# Patient Record
Sex: FEMALE | Race: OTHER | NOT HISPANIC OR LATINO | URBAN - METROPOLITAN AREA
[De-identification: names, ages, dates, MRNs, and addresses within clinical notes are randomized per-mention and may not be internally consistent; named-entity substitution may affect disease eponyms.]

---

## 2019-12-26 ENCOUNTER — EMERGENCY (EMERGENCY)
Facility: HOSPITAL | Age: 64
LOS: 1 days | Discharge: HOME | End: 2019-12-26
Attending: EMERGENCY MEDICINE
Payer: MEDICAID

## 2019-12-26 VITALS
TEMPERATURE: 98 F | OXYGEN SATURATION: 97 % | DIASTOLIC BLOOD PRESSURE: 68 MMHG | RESPIRATION RATE: 17 BRPM | HEART RATE: 96 BPM | WEIGHT: 149.91 LBS | SYSTOLIC BLOOD PRESSURE: 117 MMHG | HEIGHT: 64 IN

## 2019-12-26 LAB
ALBUMIN SERPL ELPH-MCNC: 4.5 G/DL — SIGNIFICANT CHANGE UP (ref 3.5–5)
ALP SERPL-CCNC: 93 U/L — SIGNIFICANT CHANGE UP (ref 40–120)
ALT FLD-CCNC: 37 U/L DA — SIGNIFICANT CHANGE UP (ref 10–60)
ANION GAP SERPL CALC-SCNC: 6 MMOL/L — SIGNIFICANT CHANGE UP (ref 5–17)
APPEARANCE UR: CLEAR — SIGNIFICANT CHANGE UP
AST SERPL-CCNC: 25 U/L — SIGNIFICANT CHANGE UP (ref 10–40)
BACTERIA # UR AUTO: ABNORMAL /HPF
BASOPHILS # BLD AUTO: 0.03 K/UL — SIGNIFICANT CHANGE UP (ref 0–0.2)
BASOPHILS NFR BLD AUTO: 0.3 % — SIGNIFICANT CHANGE UP (ref 0–2)
BILIRUB SERPL-MCNC: 0.3 MG/DL — SIGNIFICANT CHANGE UP (ref 0.2–1.2)
BILIRUB UR-MCNC: NEGATIVE — SIGNIFICANT CHANGE UP
BUN SERPL-MCNC: 25 MG/DL — HIGH (ref 7–18)
CALCIUM SERPL-MCNC: 9.5 MG/DL — SIGNIFICANT CHANGE UP (ref 8.4–10.5)
CHLORIDE SERPL-SCNC: 107 MMOL/L — SIGNIFICANT CHANGE UP (ref 96–108)
CO2 SERPL-SCNC: 23 MMOL/L — SIGNIFICANT CHANGE UP (ref 22–31)
COLOR SPEC: YELLOW — SIGNIFICANT CHANGE UP
CREAT SERPL-MCNC: 1.19 MG/DL — SIGNIFICANT CHANGE UP (ref 0.5–1.3)
DIFF PNL FLD: NEGATIVE — SIGNIFICANT CHANGE UP
EOSINOPHIL # BLD AUTO: 0.19 K/UL — SIGNIFICANT CHANGE UP (ref 0–0.5)
EOSINOPHIL NFR BLD AUTO: 1.9 % — SIGNIFICANT CHANGE UP (ref 0–6)
EPI CELLS # UR: SIGNIFICANT CHANGE UP /HPF
GLUCOSE SERPL-MCNC: 243 MG/DL — HIGH (ref 70–99)
GLUCOSE UR QL: 1000 MG/DL
HCG UR QL: NEGATIVE — SIGNIFICANT CHANGE UP
HCT VFR BLD CALC: 38 % — SIGNIFICANT CHANGE UP (ref 34.5–45)
HGB BLD-MCNC: 12.1 G/DL — SIGNIFICANT CHANGE UP (ref 11.5–15.5)
IMM GRANULOCYTES NFR BLD AUTO: 0.4 % — SIGNIFICANT CHANGE UP (ref 0–1.5)
KETONES UR-MCNC: NEGATIVE — SIGNIFICANT CHANGE UP
LEUKOCYTE ESTERASE UR-ACNC: ABNORMAL
LYMPHOCYTES # BLD AUTO: 1.23 K/UL — SIGNIFICANT CHANGE UP (ref 1–3.3)
LYMPHOCYTES # BLD AUTO: 12.2 % — LOW (ref 13–44)
MCHC RBC-ENTMCNC: 30.6 PG — SIGNIFICANT CHANGE UP (ref 27–34)
MCHC RBC-ENTMCNC: 31.8 GM/DL — LOW (ref 32–36)
MCV RBC AUTO: 96.2 FL — SIGNIFICANT CHANGE UP (ref 80–100)
MONOCYTES # BLD AUTO: 0.48 K/UL — SIGNIFICANT CHANGE UP (ref 0–0.9)
MONOCYTES NFR BLD AUTO: 4.8 % — SIGNIFICANT CHANGE UP (ref 2–14)
NEUTROPHILS # BLD AUTO: 8.12 K/UL — HIGH (ref 1.8–7.4)
NEUTROPHILS NFR BLD AUTO: 80.4 % — HIGH (ref 43–77)
NITRITE UR-MCNC: NEGATIVE — SIGNIFICANT CHANGE UP
NRBC # BLD: 0 /100 WBCS — SIGNIFICANT CHANGE UP (ref 0–0)
PH UR: 5 — SIGNIFICANT CHANGE UP (ref 5–8)
PLATELET # BLD AUTO: 217 K/UL — SIGNIFICANT CHANGE UP (ref 150–400)
POTASSIUM SERPL-MCNC: 4.8 MMOL/L — SIGNIFICANT CHANGE UP (ref 3.5–5.3)
POTASSIUM SERPL-SCNC: 4.8 MMOL/L — SIGNIFICANT CHANGE UP (ref 3.5–5.3)
PROT SERPL-MCNC: 8.4 G/DL — HIGH (ref 6–8.3)
PROT UR-MCNC: 15
RBC # BLD: 3.95 M/UL — SIGNIFICANT CHANGE UP (ref 3.8–5.2)
RBC # FLD: 12.1 % — SIGNIFICANT CHANGE UP (ref 10.3–14.5)
RBC CASTS # UR COMP ASSIST: SIGNIFICANT CHANGE UP /HPF (ref 0–2)
SODIUM SERPL-SCNC: 136 MMOL/L — SIGNIFICANT CHANGE UP (ref 135–145)
SP GR SPEC: 1.01 — SIGNIFICANT CHANGE UP (ref 1.01–1.02)
UROBILINOGEN FLD QL: NEGATIVE — SIGNIFICANT CHANGE UP
WBC # BLD: 10.09 K/UL — SIGNIFICANT CHANGE UP (ref 3.8–10.5)
WBC # FLD AUTO: 10.09 K/UL — SIGNIFICANT CHANGE UP (ref 3.8–10.5)
WBC UR QL: SIGNIFICANT CHANGE UP /HPF (ref 0–5)

## 2019-12-26 PROCEDURE — 99284 EMERGENCY DEPT VISIT MOD MDM: CPT

## 2019-12-26 PROCEDURE — 80053 COMPREHEN METABOLIC PANEL: CPT

## 2019-12-26 PROCEDURE — 71046 X-RAY EXAM CHEST 2 VIEWS: CPT | Mod: 26

## 2019-12-26 PROCEDURE — 99284 EMERGENCY DEPT VISIT MOD MDM: CPT | Mod: 25

## 2019-12-26 PROCEDURE — 71046 X-RAY EXAM CHEST 2 VIEWS: CPT

## 2019-12-26 PROCEDURE — 87086 URINE CULTURE/COLONY COUNT: CPT

## 2019-12-26 PROCEDURE — 36415 COLL VENOUS BLD VENIPUNCTURE: CPT

## 2019-12-26 PROCEDURE — 85027 COMPLETE CBC AUTOMATED: CPT

## 2019-12-26 PROCEDURE — 81001 URINALYSIS AUTO W/SCOPE: CPT

## 2019-12-26 PROCEDURE — 81025 URINE PREGNANCY TEST: CPT

## 2019-12-26 PROCEDURE — 96374 THER/PROPH/DIAG INJ IV PUSH: CPT

## 2019-12-26 RX ORDER — SODIUM CHLORIDE 9 MG/ML
1000 INJECTION INTRAMUSCULAR; INTRAVENOUS; SUBCUTANEOUS ONCE
Refills: 0 | Status: COMPLETED | OUTPATIENT
Start: 2019-12-26 | End: 2019-12-26

## 2019-12-26 RX ORDER — ACETAMINOPHEN 500 MG
1000 TABLET ORAL ONCE
Refills: 0 | Status: COMPLETED | OUTPATIENT
Start: 2019-12-26 | End: 2019-12-26

## 2019-12-26 RX ADMIN — Medication 400 MILLIGRAM(S): at 14:04

## 2019-12-26 RX ADMIN — SODIUM CHLORIDE 1000 MILLILITER(S): 9 INJECTION INTRAMUSCULAR; INTRAVENOUS; SUBCUTANEOUS at 14:04

## 2019-12-26 NOTE — ED PROVIDER NOTE - PATIENT PORTAL LINK FT
You can access the FollowMyHealth Patient Portal offered by [facility] by registering at the following website: [email or web address]. By joining [employer]’s FollowMyHealth portal, you will also be able to view your health information using other applications (apps) compatible with our system.

## 2019-12-26 NOTE — ED PROVIDER NOTE - OBJECTIVE STATEMENT
64 year old female with a PMHx of HLD, HTN, and DM presenting with CC of diarrhea, cough and general malaise x3 days. Patient denies measured fever but has a sick contact at home. Patient states that when she cough, she has phlegm. Patient denies chest pain, dizziness, shortness of breath, or any other acute complaints.

## 2019-12-26 NOTE — ED PROVIDER NOTE - PROGRESS NOTE DETAILS
Patient expressing relief will Pt is well appearing walking with steady gait, stable for discharge and follow up without fail with medical doctor. I had a detailed discussion with the patient and/or guardian regarding the historical points, exam findings, and any diagnostic results supporting the discharge diagnosis. Pt educated on care and need for follow up. Strict return instructions and red flag signs and symptoms discussed with patient. Questions answered. Pt shows understanding of discharge information and agrees to follow.

## 2019-12-26 NOTE — ED PROVIDER NOTE - CLINICAL SUMMARY MEDICAL DECISION MAKING FREE TEXT BOX
Patient presenting with a few days of cough, diarrhea, and generalized malaise. Give fluids, order basic labs, order x-ray, and reassess.

## 2019-12-26 NOTE — ED ADULT NURSE NOTE - OBJECTIVE STATEMENT
pt is here for flu-like symptoms.  pt stated that flu like symptom,  and daughter with similar symptom, denied sob or fever, denied N/V/d, pt calm at this time.

## 2019-12-27 LAB
CULTURE RESULTS: SIGNIFICANT CHANGE UP
SPECIMEN SOURCE: SIGNIFICANT CHANGE UP

## 2020-09-21 NOTE — ED ADULT NURSE NOTE - ISOLATION TYPE:
[phone] to schedule ultrasound; if negative likely a muscle strain that will resolve on its own       None

## 2022-02-04 NOTE — ED ADULT TRIAGE NOTE - AS O2 DELIVERY
No indication for inhalers currently, does not have any improvement in symptoms and no obstruction on PFTs. Denies cough or wheezing.    room air

## 2022-08-30 NOTE — ED PROVIDER NOTE - PRINCIPAL DIAGNOSIS
Ro/ Dr Michelle Og 09/09/2022   Will call patient with new instructions once prescription sent
Viral illness

## 2024-11-17 ENCOUNTER — INPATIENT (INPATIENT)
Facility: HOSPITAL | Age: 69
LOS: 3 days | Discharge: HOME | DRG: 440 | End: 2024-11-21
Attending: HOSPITALIST | Admitting: HOSPITALIST
Payer: COMMERCIAL

## 2024-11-17 VITALS
SYSTOLIC BLOOD PRESSURE: 150 MMHG | DIASTOLIC BLOOD PRESSURE: 89 MMHG | HEART RATE: 70 BPM | RESPIRATION RATE: 16 BRPM | HEIGHT: 62 IN | OXYGEN SATURATION: 97 % | WEIGHT: 156.53 LBS

## 2024-11-17 DIAGNOSIS — K85.10 BILIARY ACUTE PANCREATITIS WITHOUT NECROSIS OR INFECTION: ICD-10-CM

## 2024-11-17 DIAGNOSIS — N18.30 CHRONIC KIDNEY DISEASE, STAGE 3 UNSPECIFIED: ICD-10-CM

## 2024-11-17 DIAGNOSIS — Z98.890 OTHER SPECIFIED POSTPROCEDURAL STATES: Chronic | ICD-10-CM

## 2024-11-17 DIAGNOSIS — Z29.9 ENCOUNTER FOR PROPHYLACTIC MEASURES, UNSPECIFIED: ICD-10-CM

## 2024-11-17 DIAGNOSIS — E11.9 TYPE 2 DIABETES MELLITUS WITHOUT COMPLICATIONS: ICD-10-CM

## 2024-11-17 DIAGNOSIS — E78.5 HYPERLIPIDEMIA, UNSPECIFIED: ICD-10-CM

## 2024-11-17 DIAGNOSIS — R74.01 ELEVATION OF LEVELS OF LIVER TRANSAMINASE LEVELS: ICD-10-CM

## 2024-11-17 DIAGNOSIS — K85.90 ACUTE PANCREATITIS WITHOUT NECROSIS OR INFECTION, UNSPECIFIED: ICD-10-CM

## 2024-11-17 DIAGNOSIS — I10 ESSENTIAL (PRIMARY) HYPERTENSION: ICD-10-CM

## 2024-11-17 LAB
ALBUMIN SERPL ELPH-MCNC: 3.5 G/DL — SIGNIFICANT CHANGE UP (ref 3.5–5)
ALBUMIN SERPL ELPH-MCNC: 3.9 G/DL — SIGNIFICANT CHANGE UP (ref 3.5–5)
ALP SERPL-CCNC: 280 U/L — HIGH (ref 40–120)
ALP SERPL-CCNC: 295 U/L — HIGH (ref 40–120)
ALT FLD-CCNC: 747 U/L DA — HIGH (ref 10–60)
ALT FLD-CCNC: 862 U/L DA — HIGH (ref 10–60)
ANION GAP SERPL CALC-SCNC: 12 MMOL/L — SIGNIFICANT CHANGE UP (ref 5–17)
ANION GAP SERPL CALC-SCNC: 9 MMOL/L — SIGNIFICANT CHANGE UP (ref 5–17)
APTT BLD: 27.4 SEC — SIGNIFICANT CHANGE UP (ref 24.5–35.6)
AST SERPL-CCNC: 1240 U/L — HIGH (ref 10–40)
AST SERPL-CCNC: 1261 U/L — HIGH (ref 10–40)
BASE EXCESS BLDV CALC-SCNC: -2.7 MMOL/L — SIGNIFICANT CHANGE UP
BASOPHILS # BLD AUTO: 0.04 K/UL — SIGNIFICANT CHANGE UP (ref 0–0.2)
BASOPHILS NFR BLD AUTO: 0.4 % — SIGNIFICANT CHANGE UP (ref 0–2)
BILIRUB DIRECT SERPL-MCNC: 0.9 MG/DL — HIGH (ref 0–0.3)
BILIRUB INDIRECT FLD-MCNC: 0.4 MG/DL — SIGNIFICANT CHANGE UP (ref 0.2–1)
BILIRUB SERPL-MCNC: 1.2 MG/DL — SIGNIFICANT CHANGE UP (ref 0.2–1.2)
BILIRUB SERPL-MCNC: 1.3 MG/DL — HIGH (ref 0.2–1.2)
BLD GP AB SCN SERPL QL: SIGNIFICANT CHANGE UP
BLOOD GAS COMMENTS, VENOUS: SIGNIFICANT CHANGE UP
BUN SERPL-MCNC: 22 MG/DL — HIGH (ref 7–18)
BUN SERPL-MCNC: 28 MG/DL — HIGH (ref 7–18)
CALCIUM SERPL-MCNC: 8.8 MG/DL — SIGNIFICANT CHANGE UP (ref 8.4–10.5)
CALCIUM SERPL-MCNC: 9.6 MG/DL — SIGNIFICANT CHANGE UP (ref 8.4–10.5)
CHLORIDE SERPL-SCNC: 103 MMOL/L — SIGNIFICANT CHANGE UP (ref 96–108)
CHLORIDE SERPL-SCNC: 108 MMOL/L — SIGNIFICANT CHANGE UP (ref 96–108)
CHOLEST SERPL-MCNC: 99 MG/DL — SIGNIFICANT CHANGE UP
CO2 SERPL-SCNC: 22 MMOL/L — SIGNIFICANT CHANGE UP (ref 22–31)
CO2 SERPL-SCNC: 23 MMOL/L — SIGNIFICANT CHANGE UP (ref 22–31)
CREAT SERPL-MCNC: 0.99 MG/DL — SIGNIFICANT CHANGE UP (ref 0.5–1.3)
CREAT SERPL-MCNC: 1.07 MG/DL — SIGNIFICANT CHANGE UP (ref 0.5–1.3)
EGFR: 57 ML/MIN/1.73M2 — LOW
EGFR: 62 ML/MIN/1.73M2 — SIGNIFICANT CHANGE UP
EOSINOPHIL # BLD AUTO: 0.01 K/UL — SIGNIFICANT CHANGE UP (ref 0–0.5)
EOSINOPHIL NFR BLD AUTO: 0.1 % — SIGNIFICANT CHANGE UP (ref 0–6)
GLUCOSE BLDC GLUCOMTR-MCNC: 120 MG/DL — HIGH (ref 70–99)
GLUCOSE BLDC GLUCOMTR-MCNC: 138 MG/DL — HIGH (ref 70–99)
GLUCOSE SERPL-MCNC: 234 MG/DL — HIGH (ref 70–99)
GLUCOSE SERPL-MCNC: 317 MG/DL — HIGH (ref 70–99)
HCO3 BLDV-SCNC: 23 MMOL/L — SIGNIFICANT CHANGE UP (ref 22–29)
HCT VFR BLD CALC: 37.4 % — SIGNIFICANT CHANGE UP (ref 34.5–45)
HDLC SERPL-MCNC: 48 MG/DL — LOW
HGB BLD-MCNC: 12.9 G/DL — SIGNIFICANT CHANGE UP (ref 11.5–15.5)
HOROWITZ INDEX BLDV+IHG-RTO: SIGNIFICANT CHANGE UP
IMM GRANULOCYTES NFR BLD AUTO: 0.3 % — SIGNIFICANT CHANGE UP (ref 0–0.9)
INR BLD: 0.9 RATIO — SIGNIFICANT CHANGE UP (ref 0.85–1.16)
LACTATE SERPL-SCNC: 0.9 MMOL/L — SIGNIFICANT CHANGE UP (ref 0.7–2)
LACTATE SERPL-SCNC: 3.5 MMOL/L — HIGH (ref 0.7–2)
LACTATE SERPL-SCNC: 4.5 MMOL/L — CRITICAL HIGH (ref 0.7–2)
LIDOCAIN IGE QN: 2342 U/L — HIGH (ref 13–75)
LIPID PNL WITH DIRECT LDL SERPL: 29 MG/DL — SIGNIFICANT CHANGE UP
LYMPHOCYTES # BLD AUTO: 1.2 K/UL — SIGNIFICANT CHANGE UP (ref 1–3.3)
LYMPHOCYTES # BLD AUTO: 11 % — LOW (ref 13–44)
MCHC RBC-ENTMCNC: 31.3 PG — SIGNIFICANT CHANGE UP (ref 27–34)
MCHC RBC-ENTMCNC: 34.5 G/DL — SIGNIFICANT CHANGE UP (ref 32–36)
MCV RBC AUTO: 90.8 FL — SIGNIFICANT CHANGE UP (ref 80–100)
MONOCYTES # BLD AUTO: 0.45 K/UL — SIGNIFICANT CHANGE UP (ref 0–0.9)
MONOCYTES NFR BLD AUTO: 4.1 % — SIGNIFICANT CHANGE UP (ref 2–14)
NEUTROPHILS # BLD AUTO: 9.17 K/UL — HIGH (ref 1.8–7.4)
NEUTROPHILS NFR BLD AUTO: 84.1 % — HIGH (ref 43–77)
NON HDL CHOLESTEROL: 51 MG/DL — SIGNIFICANT CHANGE UP
NRBC # BLD: 0 /100 WBCS — SIGNIFICANT CHANGE UP (ref 0–0)
PCO2 BLDV: 43 MMHG — HIGH (ref 39–42)
PH BLDV: 7.34 — SIGNIFICANT CHANGE UP (ref 7.32–7.43)
PLATELET # BLD AUTO: 251 K/UL — SIGNIFICANT CHANGE UP (ref 150–400)
PO2 BLDV: 44 MMHG — SIGNIFICANT CHANGE UP
POTASSIUM SERPL-MCNC: 3.8 MMOL/L — SIGNIFICANT CHANGE UP (ref 3.5–5.3)
POTASSIUM SERPL-MCNC: 4.1 MMOL/L — SIGNIFICANT CHANGE UP (ref 3.5–5.3)
POTASSIUM SERPL-SCNC: 3.8 MMOL/L — SIGNIFICANT CHANGE UP (ref 3.5–5.3)
POTASSIUM SERPL-SCNC: 4.1 MMOL/L — SIGNIFICANT CHANGE UP (ref 3.5–5.3)
PROT SERPL-MCNC: 6.5 G/DL — SIGNIFICANT CHANGE UP (ref 6–8.3)
PROT SERPL-MCNC: 7.8 G/DL — SIGNIFICANT CHANGE UP (ref 6–8.3)
PROTHROM AB SERPL-ACNC: 10.4 SEC — SIGNIFICANT CHANGE UP (ref 9.9–13.4)
RBC # BLD: 4.12 M/UL — SIGNIFICANT CHANGE UP (ref 3.8–5.2)
RBC # FLD: 12.1 % — SIGNIFICANT CHANGE UP (ref 10.3–14.5)
SAO2 % BLDV: 72 % — SIGNIFICANT CHANGE UP
SODIUM SERPL-SCNC: 138 MMOL/L — SIGNIFICANT CHANGE UP (ref 135–145)
SODIUM SERPL-SCNC: 139 MMOL/L — SIGNIFICANT CHANGE UP (ref 135–145)
TRIGL SERPL-MCNC: 112 MG/DL — SIGNIFICANT CHANGE UP
TROPONIN I, HIGH SENSITIVITY RESULT: 28.7 NG/L — SIGNIFICANT CHANGE UP
WBC # BLD: 10.9 K/UL — HIGH (ref 3.8–10.5)
WBC # FLD AUTO: 10.9 K/UL — HIGH (ref 3.8–10.5)

## 2024-11-17 PROCEDURE — 99223 1ST HOSP IP/OBS HIGH 75: CPT | Mod: GC

## 2024-11-17 PROCEDURE — 76700 US EXAM ABDOM COMPLETE: CPT | Mod: 26

## 2024-11-17 PROCEDURE — 99222 1ST HOSP IP/OBS MODERATE 55: CPT | Mod: 57

## 2024-11-17 PROCEDURE — 99285 EMERGENCY DEPT VISIT HI MDM: CPT | Mod: 25

## 2024-11-17 PROCEDURE — 74177 CT ABD & PELVIS W/CONTRAST: CPT | Mod: 26,MC

## 2024-11-17 RX ORDER — EMPAGLIFLOZIN 25 MG/1
1 TABLET, FILM COATED ORAL
Refills: 0 | DISCHARGE

## 2024-11-17 RX ORDER — ONDANSETRON HYDROCHLORIDE 4 MG/1
4 TABLET, FILM COATED ORAL ONCE
Refills: 0 | Status: COMPLETED | OUTPATIENT
Start: 2024-11-17 | End: 2024-11-17

## 2024-11-17 RX ORDER — SENNOSIDES 8.6 MG
2 TABLET ORAL AT BEDTIME
Refills: 0 | Status: DISCONTINUED | OUTPATIENT
Start: 2024-11-17 | End: 2024-11-20

## 2024-11-17 RX ORDER — GLIPIZIDE 5 MG/1
1 TABLET, FILM COATED, EXTENDED RELEASE ORAL
Refills: 2 | DISCHARGE

## 2024-11-17 RX ORDER — INSULIN GLARGINE AND LIXISENATIDE 100; 33 U/ML; UG/ML
40 INJECTION, SOLUTION SUBCUTANEOUS
Refills: 3 | DISCHARGE

## 2024-11-17 RX ORDER — INFLUENZA VIRUS VACCINE 15; 15; 15; 15 UG/.5ML; UG/.5ML; UG/.5ML; UG/.5ML
0.5 SUSPENSION INTRAMUSCULAR ONCE
Refills: 0 | Status: DISCONTINUED | OUTPATIENT
Start: 2024-11-17 | End: 2024-11-21

## 2024-11-17 RX ORDER — SODIUM CHLORIDE 9 MG/ML
1000 INJECTION, SOLUTION INTRAMUSCULAR; INTRAVENOUS; SUBCUTANEOUS ONCE
Refills: 0 | Status: COMPLETED | OUTPATIENT
Start: 2024-11-17 | End: 2024-11-17

## 2024-11-17 RX ORDER — LINAGLIPTIN 5 MG/1
1 TABLET, FILM COATED ORAL
Refills: 0 | DISCHARGE

## 2024-11-17 RX ORDER — AMLODIPINE BESYLATE 10 MG/1
1 TABLET ORAL
Refills: 2 | DISCHARGE

## 2024-11-17 RX ORDER — TRIAMTERENE AND HYDROCHLOROTHIAZIDE 25; 37.5 MG/1; MG/1
1 CAPSULE ORAL
Refills: 2 | DISCHARGE

## 2024-11-17 RX ORDER — CHOLECALCIFEROL (VITAMIN D3) 10MCG/0.25
1 DROPS ORAL
Refills: 0 | DISCHARGE

## 2024-11-17 RX ORDER — POLYETHYLENE GLYCOL 3350 17 G/17G
17 POWDER, FOR SOLUTION ORAL DAILY
Refills: 0 | Status: DISCONTINUED | OUTPATIENT
Start: 2024-11-17 | End: 2024-11-20

## 2024-11-17 RX ORDER — NALOXONE HCL 0.4 MG/ML
0.4 AMPUL (ML) INJECTION ONCE
Refills: 0 | Status: DISCONTINUED | OUTPATIENT
Start: 2024-11-17 | End: 2024-11-20

## 2024-11-17 RX ORDER — HYDROMORPHONE HYDROCHLORIDE 2 MG/1
0.5 TABLET ORAL EVERY 4 HOURS
Refills: 0 | Status: DISCONTINUED | OUTPATIENT
Start: 2024-11-17 | End: 2024-11-20

## 2024-11-17 RX ORDER — KETOROLAC TROMETHAMINE 30 MG/ML
15 INJECTION INTRAMUSCULAR; INTRAVENOUS EVERY 6 HOURS
Refills: 0 | Status: DISCONTINUED | OUTPATIENT
Start: 2024-11-17 | End: 2024-11-20

## 2024-11-17 RX ORDER — METOPROLOL TARTRATE 100 MG/1
50 TABLET, FILM COATED ORAL DAILY
Refills: 0 | Status: DISCONTINUED | OUTPATIENT
Start: 2024-11-17 | End: 2024-11-20

## 2024-11-17 RX ORDER — ENALAPRIL MALEATE 2.5 MG/1
1 TABLET ORAL
Refills: 2 | DISCHARGE

## 2024-11-17 RX ORDER — METOPROLOL TARTRATE 100 MG/1
1 TABLET, FILM COATED ORAL
Refills: 2 | DISCHARGE

## 2024-11-17 RX ORDER — 0.9 % SODIUM CHLORIDE 0.9 %
1000 INTRAVENOUS SOLUTION INTRAVENOUS
Refills: 0 | Status: DISCONTINUED | OUTPATIENT
Start: 2024-11-17 | End: 2024-11-20

## 2024-11-17 RX ORDER — ONDANSETRON HYDROCHLORIDE 4 MG/1
4 TABLET, FILM COATED ORAL EVERY 8 HOURS
Refills: 0 | Status: DISCONTINUED | OUTPATIENT
Start: 2024-11-17 | End: 2024-11-20

## 2024-11-17 RX ADMIN — SODIUM CHLORIDE 1000 MILLILITER(S): 9 INJECTION, SOLUTION INTRAMUSCULAR; INTRAVENOUS; SUBCUTANEOUS at 02:41

## 2024-11-17 RX ADMIN — Medication 2 TABLET(S): at 22:19

## 2024-11-17 RX ADMIN — Medication 4 MILLIGRAM(S): at 02:40

## 2024-11-17 RX ADMIN — Medication 105 MILLILITER(S): at 10:04

## 2024-11-17 RX ADMIN — Medication 105 MILLILITER(S): at 22:25

## 2024-11-17 RX ADMIN — SODIUM CHLORIDE 1000 MILLILITER(S): 9 INJECTION, SOLUTION INTRAMUSCULAR; INTRAVENOUS; SUBCUTANEOUS at 06:19

## 2024-11-17 RX ADMIN — SODIUM CHLORIDE 1000 MILLILITER(S): 9 INJECTION, SOLUTION INTRAMUSCULAR; INTRAVENOUS; SUBCUTANEOUS at 02:40

## 2024-11-17 RX ADMIN — METOPROLOL TARTRATE 50 MILLIGRAM(S): 100 TABLET, FILM COATED ORAL at 22:19

## 2024-11-17 RX ADMIN — ONDANSETRON HYDROCHLORIDE 4 MILLIGRAM(S): 4 TABLET, FILM COATED ORAL at 02:40

## 2024-11-17 NOTE — H&P ADULT - ATTENDING COMMENTS
#Gallstone pancreatitis   #Type 2 DM   #CKD3  #Hepatic steatosis   #Gallstones   #HTN  #HLD   #DVT ppx     69yF with PMH of type 2 DM, known history of gallstones, hepatic steatosis, HTN, HLD, who presented from home with acute onset abdominal pain that started one day prior to admission. Daughter at bedside provided translation. She states that patient has a known history of gallstones. Patient usually avoids eating fatty foods. Reports that pain was acute onset after her meal, in the RUQ, 10/10, and associated with nausea. Patient was recently in [place], and returned earlier this week. While there, she was experiencing pain, and was told she has a gallbladder mass. Patient's daughter had scheduled an appointment with her doctor here, however, she presented to the ED due to acute onset of pain. At time of evaluation, pain is improved, patient passing gas, and feels hungry.     PE: as above; vitals reviewed, NAD, abdomen soft/nontender, A+Ox3, no le edema   Labs reviewed: CBC, BMP, Mg, Phos; monitor daily     -start ceftriaxone and flagyl  -NPO for now, can likely start CLD pending surgery recs #Gallstone pancreatitis   #Transaminitis  #Type 2 DM   #CKD3  #Hepatic steatosis   #Gallstones   #HTN  #HLD   #DVT ppx     69yF with PMH of type 2 DM, known history of gallstones, hepatic steatosis, HTN, HLD, who presented from home with acute onset abdominal pain that started one day prior to admission. Daughter at bedside provided translation. She states that patient has a known history of gallstones. Patient usually avoids eating fatty foods. Reports that pain was acute onset after her meal, in the RUQ, 10/10, and associated with nausea. Patient was recently in [place], and returned earlier this week. While there, she was experiencing pain, and was told she has a gallbladder mass. Patient's daughter had scheduled an appointment with her doctor here, however, she presented to the ED due to acute onset of pain. At time of evaluation, pain is improved, patient passing gas, and feels hungry.     PE: as above; vitals reviewed, NAD, abdomen soft, mildly tender to palpation, worse in the RUQ,  A+Ox3, no le edema   Labs reviewed: CBC, BMP, Mg, Phos; monitor daily   Images reviewed: distended gallbladder with multiple stones, L-renal hypodensity     -lipase >2k  -NPO for now, can likely start CLD pending surgery recs  -IVF while NPO  -PRN zofran  -pain control - toradol, dilaudid per pain scale  -lactate elevated, repeat   -f/u acetaminophen level, hepatitis panel   -LFTs likely elevated in setting of gallstone pancreatitis   -ACHS FS, ISS   -CKD likely in setting of chronic DM  -consult GI 11/18  -surgery consulted  -f/u US abdomen   -DVT ppx

## 2024-11-17 NOTE — H&P ADULT - PROBLEM SELECTOR PLAN 2
hx of hepatic steatosis presenting with abdominal pain  -AST:1240, ALT:747, ALP:295, Tbili:1.3  --CT A/P shows distended gallbladder containing multiple gallstones largest measuring without significant wall thickening or hyperemia.  -f/u RUQ US to rule out cholecystitis  -mostly hepatocellular injury despite radiographic evidence of multiple gall stones  -f/u acute hepatitis panel  -f/u serum acetemenophen levels  -avoid tylenol, hold home statin  -f/u PT/INR, calculate MELD  -trend lactate to normal  -trend LFTs  -Hepatology consult hx of hepatic steatosis and gall stones presenting with abdominal pain  -AST:1240, ALT:747, ALP:295, Tbili:1.3  --CT A/P shows distended gallbladder containing multiple gallstones largest measuring without significant wall thickening or hyperemia.  -f/u RUQ US for further evaluation of GB  -mostly hepatocellular injury despite radiographic evidence of multiple gall stones  -suspect component of DILI  possibly 2/2 metformin   -f/u acute hepatitis panel, BOB, AMA, LKB, SMA  -f/u serum acetemenophen levels  -avoid tylenol, hold home statin  -f/u PT/INR, calculate MELD  -trend lactate to normal  -trend LFTs  -Hepatology consult

## 2024-11-17 NOTE — ED ADULT TRIAGE NOTE - PAIN: PRESENCE, MLM
ACM left message for pt requesting return call to [facility] regarding ER / Covid follow up.
complains of pain/discomfort

## 2024-11-17 NOTE — CONSULT NOTE ADULT - ASSESSMENT
70 yo F with gallstone pancreatitis.  Transaminitis.    1. Recommend IV antibiotics for gallbladder.  2. Medical optimization  3. Discussed with patient and family role of surgery for gallstone pancreatitis.  Plan for robotic assisted laparoscopic cholecystectomy on 11/19/24  4. Will follow  5. D/w Dr. Mcgrath and agreed.

## 2024-11-17 NOTE — H&P ADULT - PROBLEM SELECTOR PLAN 4
hx of CKD3   p/w Creatinine: 0.99-1.07; eGFR 57-62  baseline creatinine 1.19; baseline eGFR 47  -CT A/P shows Indeterminate left renal hypodense lesion measuring 2.8 x 2.3 cm  -f/u renal US to exclude neoplasm  -avoid nephrotoxic agents (judicious use of toradol for moderate pain)  -c/w IVF for now  -trend BMP daily

## 2024-11-17 NOTE — H&P ADULT - HISTORY OF PRESENT ILLNESS
69y F with PMH of HTN, HLD, DM, CKD3, Cervical radiculopathy, Gall stones, Hepatic steatosis, and Osteopenia presenting with abdominal pain. Reports after eating at 9 PM last night, patient developed severe right upper and middle abdominal pain associated with nausea. Patient had been in [place] for the last 5 months and had a similar episode of pain and was found to have a "gallbladder mass ". However, patient did not get a complete evaluation because she came [place]. At bedside patient reports pain has improved but is still present.     In the ED:  T(F): , Max: 99 (04:48); HR:  (70 - 74); BP:  (123/71 - 150/89); RR:  (16 - 17); SpO2:  (96% - 97%)  WBC:10.90, Hb.9, PLT:251  Na:139, K:4.1, Cl:108, HCO3:22, BUN:22, sCr:0.99, Lactate: 3.5, Na:138, K:3.8, Cl:103, HCO3:23, BUN:28, sCr:1.07, Lactate: 4.5  AST:1261, ALT:862, ALP:280, Tbili:1.2, Lipase:-- , AST:1240, ALT:747, ALP:295, Tbili:1.3, Lipase:2342   CT A/P: Distended gallbladder containing multiple gallstones. No significant wall thickening or hyperemia. Indeterminate left renal hypodense lesion measuring 2.8 x 2.3 cm.     s/p morphine  - Injectable 4 milliGRAM(s); ondansetron Injectable 4 milliGRAM(s); sodium chloride 0.9% Bolus 1000 milliLiter(s); sodium chloride 0.9% Bolus 1000 milliLiter(s); sodium chloride 0.9% Bolus 1000 milliLiter(s) 69y F with PMH of HTN, HLD, DM, CKD3, Cervical radiculopathy, Gall stones, Hepatic steatosis, and Osteopenia presenting with abdominal pain. History and collateral obtained from daughter, Betzaida, providing polish translation at patient's request. Reports after eating at 9 PM last night, patient developed acute severe right upper abdominal pain radiating to the back and lower abdomen. Daughter states that patient is "tough" and does not complain of pain often but this episode of pain was debilitating to the point that the patient could not walk and almost fainted. Patient describes pain as "heaviness" that was associated with nausea but no vomiting. Daughter reports that during and after this episode patient's abdomen seemed more distended and hard. Patient just returned from visiting [place] for 5 months and reportedly had abdominal pain while she was there but not as severe. Patient states she has never experienced similar pain in the past. While in [place] patient  had an ultrasound and was told she had  "gallbladder mass " but daughter presumes she meant gall stones. On review of patient's current medications at bedside, patient seems to be taking a combination of medications prescribed in the [place] and [place], including [place] rx for metformin 850mg BID in addition to polish metformax 1000mg (i.e. metformin hydrochloride) once a day at lunchtime. Currently patient states that pain is improved however still present and reports that anything by mouth will exacerbate pain. Has not had bowel movement or passed gas yet today. Patient endorses mild cold like symptoms 2 weeks ago as well as chronic loose watery stools (1 per day) for the last year but otherwise has been in normal state of health. Denies fever, chills, headache, dizziness, chest pain, palpitations, shortness of breath, vomiting, diarrhea, constipation, and dysuria.    In the ED:  T(F): , Max: 99 (04:48); HR:  (70 - 74); BP:  (123/71 - 150/89); RR:  (16 - 17); SpO2:  (96% - 97%)  WBC:10.90, Hb.9, PLT:251  Na:139, K:4.1, Cl:108, HCO3:22, BUN:22, sCr:0.99, Lactate: 4.5-->3.5   AST:1240, ALT:747, ALP:295, Tbili:1.3, Lipase:2342   CT A/P: Distended gallbladder containing multiple gallstones. No significant wall thickening or hyperemia. Indeterminate left renal hypodense lesion measuring 2.8 x 2.3 cm.     s/p morphine  - Injectable 4 milliGRAM(s); ondansetron Injectable 4 milliGRAM(s); sodium chloride 0.9% Bolus 1000 milliLiter(s); sodium chloride 0.9% Bolus 1000 milliLiter(s); sodium chloride 0.9% Bolus 1000 milliLiter(s)

## 2024-11-17 NOTE — CONSULT NOTE ADULT - TIME BILLING
Physician time spent included preparing to see patient, obtaining and reviewing separate obtained history, performing a medically appropriate examination and evaluation, counseling and educating the patient/family/caregiver. Ordering medications, tests, procedures, referring and communicating with other health care professionals. In addition,  documenting clinical information in the electronic health record. In addition, independently interpreting results and communicating results to the patient/family/caregiver, and care coordination.

## 2024-11-17 NOTE — ED ADULT NURSE REASSESSMENT NOTE - NS ED NURSE REASSESS COMMENT FT1
Patient resting in bed, A&OX3, admitted to medicine service, awaiting floor bed. Daughter at bedside. No acute distress noted, denies chest pain, no SOB.

## 2024-11-17 NOTE — ED PROVIDER NOTE - NSICDXFAMILYHX_GEN_ALL_CORE_FT
FAMILY HISTORY:  Father  Still living? Unknown  FH: stroke, Age at diagnosis: Age Unknown    Mother  Still living? Unknown  FH: heart disease, Age at diagnosis: Age Unknown    Sibling  Still living? Unknown  FH: stroke, Age at diagnosis: Age Unknown

## 2024-11-17 NOTE — H&P ADULT - NSHPPHYSICALEXAM_GEN_ALL_CORE
LOS:     VITALS:   T(C): 36.9 (11-17-24 @ 07:20), Max: 37.2 (11-17-24 @ 04:48)  HR: 70 (11-17-24 @ 07:20) (70 - 74)  BP: 123/71 (11-17-24 @ 07:20) (123/71 - 150/89)  RR: 17 (11-17-24 @ 07:20) (16 - 17)  SpO2: 97% (11-17-24 @ 07:20) (96% - 97%)    GENERAL: NAD, lying in bed comfortably  HEAD:  Atraumatic, Normocephalic  EYES: EOMI, PERRLA, conjunctiva and sclera clear  ENT: Moist mucous membranes  NECK: Supple, No JVD  CHEST/LUNG: Clear to auscultation bilaterally; No rales, rhonchi, wheezing, or rubs. Unlabored respirations  HEART: Regular rate and rhythm; No murmurs, rubs, or gallops  ABDOMEN: BSx4; Soft, nontender, nondistended  EXTREMITIES:  2+ Peripheral Pulses, brisk capillary refill. No clubbing, cyanosis, or edema  NERVOUS SYSTEM:  A&Ox3, no focal deficits   SKIN: No rashes or lesions LOS:     VITALS:   T(C): 36.9 (11-17-24 @ 07:20), Max: 37.2 (11-17-24 @ 04:48)  HR: 70 (11-17-24 @ 07:20) (70 - 74)  BP: 123/71 (11-17-24 @ 07:20) (123/71 - 150/89)  RR: 17 (11-17-24 @ 07:20) (16 - 17)  SpO2: 97% (11-17-24 @ 07:20) (96% - 97%)    GENERAL: NAD, lying in bed comfortably  HEAD:  Atraumatic, Normocephalic  EYES: EOMI, PERRLA, conjunctiva and sclera clear  ENT: Moist mucous membranes  NECK: Supple, No JVD  CHEST/LUNG: Clear to auscultation bilaterally; No rales, rhonchi, wheezing, or rubs. Unlabored respirations  HEART: Regular rate and rhythm; No murmurs, rubs, or gallops  ABDOMEN: BSx4; Soft, tender to palpation over epigastrium and RUQ, no rebound or guarding, mildly distended  EXTREMITIES:  2+ Peripheral Pulses, brisk capillary refill. No clubbing, cyanosis, or edema  NERVOUS SYSTEM:  A&Ox3, no focal deficits   SKIN: No rashes or lesions

## 2024-11-17 NOTE — ED PROVIDER NOTE - PROGRESS NOTE DETAILS
Lipase resulted at 2342.  Possibly gallstone pancreatitis patient pending CT abdomen pelvis. LAZARUS DO:  Lipase resulted at 2342.  Possibly gallstone pancreatitis patient pending CT abdomen pelvis. LAZARUS DO:    CT shows gallstones without evidence of obstruction or cholecystitis.  Patient's lipase resulted at 2340.  Clinically patient has pancreatitis and will require admission for MRCP.  Discussed case with hospitalist Dr. Hines who recommended repeat C MP and lactate.  If stable or decreasing patient to be admitted to medical service.

## 2024-11-17 NOTE — H&P ADULT - PROBLEM SELECTOR PLAN 1
hx of gall stones p/w acute post prandial abdominal pain  -Lipase 2342   -CT A/P shows distended gallbladder containing multiple gallstones largest measuring without significant wall thickening or hyperemia. Pancreas within normal limits.   -c/w moderate IV fluid hydration protocol (1.5cc/kg) - LR 105cc/hr  -NPO until able to tolerate diet  -zofran 4mg ivp q8 prn n/v  -pain management - toradol 15mg IV q6 (moderate), dilaudid 0.5mg IV q4 (severe)  -GI consult hx of gall stones p/w acute post prandial abdominal pain  -Lipase 2342, Lactate 4.5-->3.5   -CT A/P shows distended gallbladder containing multiple gallstones largest measuring without significant wall thickening or hyperemia. Pancreas within normal limits.   -c/w moderate IV fluid hydration protocol (1.5cc/kg) - LR 105cc/hr  -Given mostly hepatocellular component of liver injury in s/o high doses of metformin and GLP-1 agonist pancreatitis may also be related to medications  -f/u serum triglycerides  -trend lactate to normal  -NPO until able to tolerate diet  -zofran 4mg ivp q8 prn n/v  -pain management - toradol 15mg IV q6 (moderate), dilaudid 0.5mg IV q4 (severe)  -Surgery consulted  -GI consult Monday AM

## 2024-11-17 NOTE — H&P ADULT - ASSESSMENT
69y F with PMH of HTN, HLD, DM, CKD3, Cervical radiculopathy, Gall stones, Hepatic steatosis, and Osteopenia presenting with abdominal pain. Found to have distended bladder with multiple gall stones on CT with significant transaminitis and elevated lipase. Admitted to medicine for galls stone pancreatitis.

## 2024-11-17 NOTE — ED PROVIDER NOTE - NSICDXPASTMEDICALHX_GEN_ALL_CORE_FT
PAST MEDICAL HISTORY:  2019 novel coronavirus disease (COVID-19)     Cervical radiculopathy     Diabetes     Gall stones     Hepatic steatosis     HLD (hyperlipidemia)     HTN (hypertension)     Osteopenia     Stage 3 chronic kidney disease

## 2024-11-17 NOTE — ED ADULT NURSE NOTE - NSFALLUNIVINTERV_ED_ALL_ED
Bed/Stretcher in lowest position, wheels locked, appropriate side rails in place/Call bell, personal items and telephone in reach/Instruct patient to call for assistance before getting out of bed/chair/stretcher/Non-slip footwear applied when patient is off stretcher/[place] to call system/Physically safe environment - no spills, clutter or unnecessary equipment/Purposeful proactive rounding/Room/bathroom lighting operational, light cord in reach

## 2024-11-17 NOTE — H&P ADULT - PROBLEM SELECTOR PLAN 6
hx of HLD on atorvastatin 80mg qhs  -hold home statin in s/o transaminitis  -f/u lipid profile  -calculate ASCVD risk  -DASH diet when no longer NPO

## 2024-11-17 NOTE — ED PROVIDER NOTE - OBJECTIVE STATEMENT
69-year-old female presents for an episode of abdominal pain.  Patient after eating at 9 PM had severe right upper and middle abdominal pain associated with nausea.  Patient's daughter provided translation as per patient request.  As per daughter patient was in [place] for the last 5 months and had a similar episode of pain where she was found to have a "gallbladder mass "but did not get a complete evaluation because she came [place].  The bedside patient reports pain has improved but is still present. 69-year-old female presents for an episode of abdominal pain.  Patient after eating at 9 PM had severe right upper and middle abdominal pain associated with nausea.  Patient's daughter provided translation as per patient request.  As per daughter patient was in [place] for the last 5 months and had a similar episode of pain where she was found to have a "gallbladder mass "but did not get a complete evaluation because she came [place] the [place].  The bedside patient reports pain has improved but is still present.    General: Elderly, frail appearing  HEENT: Loss of orbital fat. Thinning of eyebrows. Dry mucous membranes. Poor dentition.  Heart: RRR. Systolic murmur.   Lungs: Diminished lungs sounds, CTA b/l  Chest/Back: Non-tender chest wall. No CVAT. Kyphosis.  Abdomen: +RUQ TTP, +Epigastric TTP, non-distended,  Neuro: No focal deficits. Bilateral Presbycusis.  Extremities: Decreased ROM in hips/knee/shoulders. Pulses intact x 4.  Skin: Thin. Dry. Normal color. No rashes. Capillary refill intact.  Psych: Calm, cooperative.

## 2024-11-17 NOTE — H&P ADULT - PROBLEM SELECTOR PLAN 3
h/o DM on insulin, metformin, jardiance,  tradjenta, and glipizide at home  - hold oral dm meds  -f/u A1c  -On glargine/lixesenatide 45U qAM at home  -c/w moderate sliding scale   -Adjust insulin as indicated  -FS q6 while NPO  -Carb steady diet when no longer NPO h/o DM on insulin-GLP1 combination, metformin (+metformax), farxiga at home  - hold oral dm meds  -f/u A1c  -On glargine/lixesenatide 45U qAM at home  -c/w moderate sliding scale   -Adjust insulin as indicated  -FS q6 while NPO  -Carb steady diet when no longer NPO

## 2024-11-17 NOTE — ED PROVIDER NOTE - CLINICAL SUMMARY MEDICAL DECISION MAKING FREE TEXT BOX
Given history and exam I have a low suspicion for AAA, SBO, appendicitis, mesenteric ischemia, nephrolithiasis, pyelonephritis, or diverticulitis. I have a moderate concern for pancreatitis, gastritis vs non-bleeding peptic ulcer, or hepatobiliary disease and thus will obtain labs and imaging.    ED Workup: CBC, BMP, LFTs, Lipase, LDH    No signs of severe pancreatitis on exam such as ecchymosis of periumbilical region or flanks, hypoxia, or tachypnea.    Disposition: Admit for bowel rest, continued analgesia, monitoring, MRI for further evaluation of pancreatitis.

## 2024-11-17 NOTE — H&P ADULT - NSHPSOCIALHISTORY_GEN_ALL_CORE
, lives at home with , born in [place], never smoker, denies EtOH, denies illicit substance use , lives at home with  and daughter, born in [place], retired,  never smoker, denies EtOH, denies illicit substance use

## 2024-11-17 NOTE — CONSULT NOTE ADULT - SUBJECTIVE AND OBJECTIVE BOX
General Surgery Consultation Note    Patient is a 69y old  Female who presents with a chief complaint of gall stone pancreatitis (2024 08:27)      HPI:  69y F with PMH of HTN, HLD, DM, CKD3, Cervical radiculopathy, Gall stones, Hepatic steatosis, and Osteopenia presenting with abdominal pain. History and collateral obtained from daughter, Betzaida, providing polish translation at patient's request. Reports after eating at 9 PM last night, patient developed acute severe right upper abdominal pain radiating to the back and lower abdomen. Daughter states that patient is "tough" and does not complain of pain often but this episode of pain was debilitating to the point that the patient could not walk and almost fainted. Patient describes pain as "heaviness" that was associated with nausea but no vomiting. Daughter reports that during and after this episode patient's abdomen seemed more distended and hard. Patient just returned from visiting [place] for 5 months and reportedly had abdominal pain while she was there but not as severe. Patient states she has never experienced similar pain in the past. While in [place] patient  had an ultrasound and was told she had  "gallbladder mass " but daughter presumes she meant gall stones. On review of patient's current medications at bedside, patient seems to be taking a combination of medications prescribed in the [place] and [place], including [place] rx for metformin 850mg BID in addition to polish metformax 1000mg (i.e. metformin hydrochloride) once a day at lunchtime. Currently patient states that pain is improved however still present and reports that anything by mouth will exacerbate pain. Has not had bowel movement or passed gas yet today. Patient endorses mild cold like symptoms 2 weeks ago as well as chronic loose watery stools (1 per day) for the last year but otherwise has been in normal state of health. Denies fever, chills, headache, dizziness, chest pain, palpitations, shortness of breath, vomiting, diarrhea, constipation, and dysuria.    In the ED:  T(F): , Max: 99 (04:48); HR:  (70 - 74); BP:  (123/71 - 150/89); RR:  (16 - 17); SpO2:  (96% - 97%)  WBC:10.90, Hb.9, PLT:251  Na:139, K:4.1, Cl:108, HCO3:22, BUN:22, sCr:0.99, Lactate: 4.5-->3.5   AST:1240, ALT:747, ALP:295, Tbili:1.3, Lipase:2342   CT A/P: Distended gallbladder containing multiple gallstones. No significant wall thickening or hyperemia. Indeterminate left renal hypodense lesion measuring 2.8 x 2.3 cm.     s/p morphine  - Injectable 4 milliGRAM(s); ondansetron Injectable 4 milliGRAM(s); sodium chloride 0.9% Bolus 1000 milliLiter(s); sodium chloride 0.9% Bolus 1000 milliLiter(s); sodium chloride 0.9% Bolus 1000 milliLiter(s) (2024 08:27)    INTERVAL HPI:  70 yo F, Polish speaking, with PMH HTN, HLD, DM, CKD3, gallstones presents to ED complaining of abdominal pain. Patient's daughter served as .  Patient's abdominal pain began last night at 8pm immediately after eating. Per daughter, pain was in right upper abdomen and radiated to the back.  Patient had severe nausea but no vomiting.  Patient was in [place] and had an ultrasound that showed a "gallbladder mass" for which she did no further workup in [place].  Patient states had a similar episode of abdominal pain in the past that resolved on its own.  Patient took a polish form of metformin as well as her [place] prescribed metformin, 1000 mg each daily). Patient states pain has improved.  Denies fever or chills. Denies jaundice.      PAST MEDICAL & SURGICAL HISTORY:  Diabetes      HLD (hyperlipidemia)      HTN (hypertension)      Stage 3 chronic kidney disease      Cervical radiculopathy      Gall stones      Osteopenia      Hepatic steatosis      2019 novel coronavirus disease (COVID-19)      History of colonoscopy          Allergies    No Known Allergies    Intolerances        MEDICATIONS  (STANDING):  influenza  Vaccine (HIGH DOSE) 0.5 milliLiter(s) IntraMuscular once  insulin lispro (ADMELOG) corrective regimen sliding scale   SubCutaneous every 6 hours  lactated ringers. 1000 milliLiter(s) (105 mL/Hr) IV Continuous <Continuous>  naloxone Injectable 0.4 milliGRAM(s) IV Push once  polyethylene glycol 3350 17 Gram(s) Oral daily  senna 2 Tablet(s) Oral at bedtime    MEDICATIONS  (PRN):  bisacodyl 5 milliGRAM(s) Oral daily PRN Constipation  HYDROmorphone  Injectable 0.5 milliGRAM(s) IV Push every 4 hours PRN Severe Pain (7 - 10)  ketorolac   Injectable 15 milliGRAM(s) IV Push every 6 hours PRN Moderate Pain (4 - 6)  ondansetron Injectable 4 milliGRAM(s) IV Push every 8 hours PRN Nausea and/or Vomiting      Vital Signs Last 24 Hrs  T(C): 37.1 (2024 13:58), Max: 37.2 (2024 04:48)  T(F): 98.8 (2024 13:58), Max: 99 (2024 04:48)  HR: 78 (2024 13:58) (70 - 78)  BP: 161/72 (2024 13:58) (123/71 - 161/72)  BP(mean): --  RR: 17 (2024 13:58) (16 - 17)  SpO2: 97% (2024 13:58) (96% - 97%)    Parameters below as of 2024 13:58  Patient On (Oxygen Delivery Method): room air        Physical Exam:  Gen: awake, alert oriented NAD  HEENT: anicteric, normocephalic  Abd: soft, mild epigastric tenderness, not distended, not guarding or rigidity  Ext: warm to touch no c/c/e    Labs:                          12.9   10.90 )-----------( 251      ( 2024 02:04 )             37.4         139  |  108  |  22[H]  ----------------------------<  234[H]  4.1   |  22  |  0.99    Ca    8.8      2024 06:22    TPro  6.5  /  Alb  3.5  /  TBili  1.2  /  DBili  x   /  AST  1261[H]  /  ALT  862[H]  /  AlkPhos  280[H]  11-17    PT/INR - ( 2024 08:02 )   PT: 10.4 sec;   INR: 0.90 ratio         PTT - ( 2024 08:02 )  PTT:27.4 sec  Urinalysis Basic - ( 2024 06:22 )    Color: x / Appearance: x / SG: x / pH: x  Gluc: 234 mg/dL / Ketone: x  / Bili: x / Urobili: x   Blood: x / Protein: x / Nitrite: x   Leuk Esterase: x / RBC: x / WBC x   Sq Epi: x / Non Sq Epi: x / Bacteria: x        Radiological Exams:  < from: CT Abdomen and Pelvis w/ IV Cont (. @ 04:34) >  IMPRESSION:    Distended gallbladder containing multiple gallstones. No significant wall   thickening or hyperemia. Consider right upper quadrant ultrasound exclude   gallbladder disease.    < end of copied text >

## 2024-11-17 NOTE — H&P ADULT - PROBLEM SELECTOR PLAN 5
h/o HTN on enalapril and amlodipine at home  -c/w home meds with parameters  -monitor BP  -adjust antihypertensive meds as appropriate h/o HTN on european medication triplixam (Perindopril, Indapamide, and Amlodipine) and metoprolol ER at home  -c/w home amlodipine and metoprolol with parameters  -monitor BP  -adjust antihypertensive meds as appropriate

## 2024-11-18 LAB
A1C WITH ESTIMATED AVERAGE GLUCOSE RESULT: 9.8 % — HIGH (ref 4–5.6)
AFP-TM SERPL-MCNC: 1.8 NG/ML — SIGNIFICANT CHANGE UP
ALBUMIN SERPL ELPH-MCNC: 3.4 G/DL — LOW (ref 3.5–5)
ALP SERPL-CCNC: 406 U/L — HIGH (ref 40–120)
ALT FLD-CCNC: 795 U/L DA — HIGH (ref 10–60)
ANION GAP SERPL CALC-SCNC: 6 MMOL/L — SIGNIFICANT CHANGE UP (ref 5–17)
APAP SERPL-MCNC: <2 UG/ML — LOW (ref 10–30)
AST SERPL-CCNC: 496 U/L — HIGH (ref 10–40)
BASOPHILS # BLD AUTO: 0.02 K/UL — SIGNIFICANT CHANGE UP (ref 0–0.2)
BASOPHILS NFR BLD AUTO: 0.4 % — SIGNIFICANT CHANGE UP (ref 0–2)
BILIRUB SERPL-MCNC: 1.9 MG/DL — HIGH (ref 0.2–1.2)
BUN SERPL-MCNC: 12 MG/DL — SIGNIFICANT CHANGE UP (ref 7–18)
CALCIUM SERPL-MCNC: 9.1 MG/DL — SIGNIFICANT CHANGE UP (ref 8.4–10.5)
CHLORIDE SERPL-SCNC: 106 MMOL/L — SIGNIFICANT CHANGE UP (ref 96–108)
CHOLEST SERPL-MCNC: 98 MG/DL — SIGNIFICANT CHANGE UP
CO2 SERPL-SCNC: 29 MMOL/L — SIGNIFICANT CHANGE UP (ref 22–31)
CREAT SERPL-MCNC: 0.79 MG/DL — SIGNIFICANT CHANGE UP (ref 0.5–1.3)
EGFR: 81 ML/MIN/1.73M2 — SIGNIFICANT CHANGE UP
EOSINOPHIL # BLD AUTO: 0.13 K/UL — SIGNIFICANT CHANGE UP (ref 0–0.5)
EOSINOPHIL NFR BLD AUTO: 2.3 % — SIGNIFICANT CHANGE UP (ref 0–6)
ESTIMATED AVERAGE GLUCOSE: 235 MG/DL — HIGH (ref 68–114)
GLUCOSE BLDC GLUCOMTR-MCNC: 149 MG/DL — HIGH (ref 70–99)
GLUCOSE BLDC GLUCOMTR-MCNC: 175 MG/DL — HIGH (ref 70–99)
GLUCOSE BLDC GLUCOMTR-MCNC: 192 MG/DL — HIGH (ref 70–99)
GLUCOSE BLDC GLUCOMTR-MCNC: 231 MG/DL — HIGH (ref 70–99)
GLUCOSE BLDC GLUCOMTR-MCNC: 285 MG/DL — HIGH (ref 70–99)
GLUCOSE BLDC GLUCOMTR-MCNC: 335 MG/DL — HIGH (ref 70–99)
GLUCOSE SERPL-MCNC: 150 MG/DL — HIGH (ref 70–99)
HAV IGM SER-ACNC: SIGNIFICANT CHANGE UP
HBV CORE IGM SER-ACNC: SIGNIFICANT CHANGE UP
HBV SURFACE AG SER-ACNC: SIGNIFICANT CHANGE UP
HCT VFR BLD CALC: 34.6 % — SIGNIFICANT CHANGE UP (ref 34.5–45)
HCV AB S/CO SERPL IA: 0.1 S/CO — SIGNIFICANT CHANGE UP (ref 0–0.99)
HCV AB SERPL-IMP: SIGNIFICANT CHANGE UP
HDLC SERPL-MCNC: 53 MG/DL — SIGNIFICANT CHANGE UP
HGB BLD-MCNC: 11.9 G/DL — SIGNIFICANT CHANGE UP (ref 11.5–15.5)
IMM GRANULOCYTES NFR BLD AUTO: 0.2 % — SIGNIFICANT CHANGE UP (ref 0–0.9)
LACTATE SERPL-SCNC: 1 MMOL/L — SIGNIFICANT CHANGE UP (ref 0.7–2)
LIPID PNL WITH DIRECT LDL SERPL: 18 MG/DL — SIGNIFICANT CHANGE UP
LYMPHOCYTES # BLD AUTO: 1.75 K/UL — SIGNIFICANT CHANGE UP (ref 1–3.3)
LYMPHOCYTES # BLD AUTO: 30.8 % — SIGNIFICANT CHANGE UP (ref 13–44)
MAGNESIUM SERPL-MCNC: 1.7 MG/DL — SIGNIFICANT CHANGE UP (ref 1.6–2.6)
MCHC RBC-ENTMCNC: 30.8 PG — SIGNIFICANT CHANGE UP (ref 27–34)
MCHC RBC-ENTMCNC: 34.4 G/DL — SIGNIFICANT CHANGE UP (ref 32–36)
MCV RBC AUTO: 89.6 FL — SIGNIFICANT CHANGE UP (ref 80–100)
MONOCYTES # BLD AUTO: 0.35 K/UL — SIGNIFICANT CHANGE UP (ref 0–0.9)
MONOCYTES NFR BLD AUTO: 6.2 % — SIGNIFICANT CHANGE UP (ref 2–14)
NEUTROPHILS # BLD AUTO: 3.42 K/UL — SIGNIFICANT CHANGE UP (ref 1.8–7.4)
NEUTROPHILS NFR BLD AUTO: 60.1 % — SIGNIFICANT CHANGE UP (ref 43–77)
NON HDL CHOLESTEROL: 45 MG/DL — SIGNIFICANT CHANGE UP
NRBC # BLD: 0 /100 WBCS — SIGNIFICANT CHANGE UP (ref 0–0)
PHOSPHATE SERPL-MCNC: 2.9 MG/DL — SIGNIFICANT CHANGE UP (ref 2.5–4.5)
PLATELET # BLD AUTO: 197 K/UL — SIGNIFICANT CHANGE UP (ref 150–400)
POTASSIUM SERPL-MCNC: 3.3 MMOL/L — LOW (ref 3.5–5.3)
POTASSIUM SERPL-SCNC: 3.3 MMOL/L — LOW (ref 3.5–5.3)
PROT SERPL-MCNC: 6.7 G/DL — SIGNIFICANT CHANGE UP (ref 6–8.3)
RBC # BLD: 3.86 M/UL — SIGNIFICANT CHANGE UP (ref 3.8–5.2)
RBC # FLD: 12.6 % — SIGNIFICANT CHANGE UP (ref 10.3–14.5)
SODIUM SERPL-SCNC: 141 MMOL/L — SIGNIFICANT CHANGE UP (ref 135–145)
TRIGL SERPL-MCNC: 135 MG/DL — SIGNIFICANT CHANGE UP
WBC # BLD: 5.68 K/UL — SIGNIFICANT CHANGE UP (ref 3.8–10.5)
WBC # FLD AUTO: 5.68 K/UL — SIGNIFICANT CHANGE UP (ref 3.8–10.5)

## 2024-11-18 PROCEDURE — 99232 SBSQ HOSP IP/OBS MODERATE 35: CPT | Mod: GC

## 2024-11-18 PROCEDURE — 99223 1ST HOSP IP/OBS HIGH 75: CPT

## 2024-11-18 PROCEDURE — 74183 MRI ABD W/O CNTR FLWD CNTR: CPT | Mod: 26

## 2024-11-18 RX ORDER — METRONIDAZOLE 500 MG/1
500 TABLET ORAL ONCE
Refills: 0 | Status: COMPLETED | OUTPATIENT
Start: 2024-11-18 | End: 2024-11-18

## 2024-11-18 RX ORDER — METRONIDAZOLE 500 MG/1
500 TABLET ORAL EVERY 8 HOURS
Refills: 0 | Status: DISCONTINUED | OUTPATIENT
Start: 2024-11-18 | End: 2024-11-20

## 2024-11-18 RX ORDER — METRONIDAZOLE 500 MG/1
TABLET ORAL
Refills: 0 | Status: DISCONTINUED | OUTPATIENT
Start: 2024-11-18 | End: 2024-11-20

## 2024-11-18 RX ORDER — CEFTRIAXONE SODIUM 1 G
1000 VIAL (EA) INJECTION EVERY 24 HOURS
Refills: 0 | Status: DISCONTINUED | OUTPATIENT
Start: 2024-11-18 | End: 2024-11-20

## 2024-11-18 RX ORDER — POTASSIUM CHLORIDE 600 MG/1
40 TABLET, EXTENDED RELEASE ORAL ONCE
Refills: 0 | Status: COMPLETED | OUTPATIENT
Start: 2024-11-18 | End: 2024-11-18

## 2024-11-18 RX ADMIN — Medication 6: at 17:32

## 2024-11-18 RX ADMIN — Medication 2: at 00:57

## 2024-11-18 RX ADMIN — METRONIDAZOLE 100 MILLIGRAM(S): 500 TABLET ORAL at 10:45

## 2024-11-18 RX ADMIN — METRONIDAZOLE 100 MILLIGRAM(S): 500 TABLET ORAL at 21:43

## 2024-11-18 RX ADMIN — Medication 2 TABLET(S): at 21:43

## 2024-11-18 RX ADMIN — POTASSIUM CHLORIDE 40 MILLIEQUIVALENT(S): 600 TABLET, EXTENDED RELEASE ORAL at 10:45

## 2024-11-18 RX ADMIN — Medication 100 MILLIGRAM(S): at 12:11

## 2024-11-18 RX ADMIN — Medication 4: at 23:50

## 2024-11-18 RX ADMIN — METOPROLOL TARTRATE 50 MILLIGRAM(S): 100 TABLET, FILM COATED ORAL at 06:45

## 2024-11-18 RX ADMIN — Medication 2: at 12:10

## 2024-11-18 RX ADMIN — METRONIDAZOLE 100 MILLIGRAM(S): 500 TABLET ORAL at 17:32

## 2024-11-18 NOTE — PROGRESS NOTE ADULT - NS ATTEND AMEND GEN_ALL_CORE FT
69F presenting with epigastric and RUQ tenderness for 36 hours days, nausea. Hx of diabetes mellitus type 2 on metformin, CKD found to have pancreatitis secondary to elevated lipase levels    CT: distended gallbladder with large gallstone  Lipase 2K  Tbili 1.2  wbc 11k  On exam there is RUQ tenderness, mild epigastric tenderness  no jaundice    Acute gallstone pancreatitis  - clears as tolerated  - tbili bump to 1.9, recommend MRCP  - OR 11/20 Robotic cholecystectomy tentatively booked  - medical clearance noted

## 2024-11-18 NOTE — CONSULT NOTE ADULT - SUBJECTIVE AND OBJECTIVE BOX
INITIAL GI CONSULTATION    Patient is a 69y old  Female who presents with a chief complaint of gall stone pancreatitis (2024 09:42)    HPI:  69y F with PMH of HTN, HLD, DM, CKD3, Cervical radiculopathy, Gall stones, Hepatic steatosis, and Osteopenia presenting with abdominal pain. History and collateral obtained from daughter Betzaida, providing polish translation at patient's request. Reports after eating at 9 PM last night, patient developed acute severe right upper abdominal pain radiating to the back and lower abdomen. Daughter states that patient is "tough" and does not complain of pain often but this episode of pain was debilitating to the point that the patient could not walk and almost fainted. Patient describes pain as "heaviness" that was associated with nausea but no vomiting. Daughter reports that during and after this episode patient's abdomen seemed more distended and hard. Patient just returned from visiting [place] for 5 months and reportedly had abdominal pain while she was there but not as severe. Patient states she has never experienced similar pain in the past. While in [place] patient  had an ultrasound and was told she had  "gallbladder mass " but daughter presumes she meant gall stones. On review of patient's current medications at bedside, patient seems to be taking a combination of medications prescribed in the [place] and [place], including [place] rx for metformin 850mg BID in addition to polish metformax 1000mg (i.e. metformin hydrochloride) once a day at lunchtime. Currently patient states that pain is improved however still present and reports that anything by mouth will exacerbate pain. Has not had bowel movement or passed gas yet today. Patient endorses mild cold like symptoms 2 weeks ago as well as chronic loose watery stools (1 per day) for the last year but otherwise has been in normal state of health. Denies fever, chills, headache, dizziness, chest pain, palpitations, shortness of breath, vomiting, diarrhea, constipation, and dysuria.    GI HPI: patient seen and examined on unit. Resting in bed. DaughterAmia, at bedside providing translation. Patient endorses coming to the ED after severe RUQ pain after eating. Daughter states patient stomach was also bloated appearing like a hard ball. patient denies any N/V/D. last BM on friday which was diarrheal. Patient states she usually has a BM once every 3 days. Patient denies any h/x of EGD or colonoscopy    In the ED:  T(F): , Max: 99 (04:48); HR:  (70 - 74); BP:  (123/71 - 150/89); RR:  (16 - 17); SpO2:  (96% - 97%)  WBC:10.90, Hb.9, PLT:251  Na:139, K:4.1, Cl:108, HCO3:22, BUN:22, sCr:0.99, Lactate: 4.5-->3.5   AST:1240, ALT:747, ALP:295, Tbili:1.3, Lipase:2342   CT A/P: Distended gallbladder containing multiple gallstones. No significant wall thickening or hyperemia. Indeterminate left renal hypodense lesion measuring 2.8 x 2.3 cm.     s/p morphine  - Injectable 4 milliGRAM(s); ondansetron Injectable 4 milliGRAM(s); sodium chloride 0.9% Bolus 1000 milliLiter(s); sodium chloride 0.9% Bolus 1000 milliLiter(s); sodium chloride 0.9% Bolus 1000 milliLiter(s) (2024 08:27)        PMH/PSH:  PAST MEDICAL & SURGICAL HISTORY:  Diabetes      HLD (hyperlipidemia)      HTN (hypertension)      Stage 3 chronic kidney disease      Cervical radiculopathy      Gall stones      Osteopenia      Hepatic steatosis      2019 novel coronavirus disease (COVID-19)      History of colonoscopy            FH:  FAMILY HISTORY:  FH: heart disease (Mother)    FH: stroke (Father, Sibling)          MEDS:  MEDICATIONS  (STANDING):  cefTRIAXone   IVPB 1000 milliGRAM(s) IV Intermittent every 24 hours  influenza  Vaccine (HIGH DOSE) 0.5 milliLiter(s) IntraMuscular once  insulin lispro (ADMELOG) corrective regimen sliding scale   SubCutaneous every 6 hours  lactated ringers. 1000 milliLiter(s) (105 mL/Hr) IV Continuous <Continuous>  metoprolol succinate ER 50 milliGRAM(s) Oral daily  metroNIDAZOLE  IVPB      metroNIDAZOLE  IVPB 500 milliGRAM(s) IV Intermittent every 8 hours  naloxone Injectable 0.4 milliGRAM(s) IV Push once  polyethylene glycol 3350 17 Gram(s) Oral daily  senna 2 Tablet(s) Oral at bedtime    MEDICATIONS  (PRN):  bisacodyl 5 milliGRAM(s) Oral daily PRN Constipation  HYDROmorphone  Injectable 0.5 milliGRAM(s) IV Push every 4 hours PRN Severe Pain (7 - 10)  ketorolac   Injectable 15 milliGRAM(s) IV Push every 6 hours PRN Moderate Pain (4 - 6)  ondansetron Injectable 4 milliGRAM(s) IV Push every 8 hours PRN Nausea and/or Vomiting    Allergies    No Known Allergies    Intolerances          ROS: A detailed set of ROS were asked and negative except those outlined in GI HPI.  ______________________________________________________________________  PHYSICAL EXAM:  T(C): 36.7 (24 @ 05:43), Max: 37.1 (24 @ 13:58)  HR: 65 (24 @ 05:43)  BP: 152/78 (24 @ 05:43)  RR: 17 (24 @ 05:43)  SpO2: 96% (24 @ 05:43)  Wt(kg): --      --------------------------------------------------------  IN:    Lactated Ringers: 840 mL  Total IN: 840 mL    OUT:  Total OUT: 0 mL    Total NET: 840 mL          GEN: NAD  HEENT: EOMI, conjunctivae anicteric, neck supple, moist mucous membranes  PULM: LSCTAB, no wheezing, rales, or rhonchi  CV: RRR, no m/r/b  GI: Soft, RUQ tenderness with deep palpitation, ND; +BS in all four quadrants, no ascites, no Montoya's sign  MSK: KEARNEY, no edema  NEURO: A&O x 3, no gross deficits  ______________________________________________________________________  LABS:                        11.9   5.68  )-----------( 197      ( 2024 05:27 )             34.6         141  |  106  |  12  ----------------------------<  150[H]  3.3[L]   |  29  |  0.79    Ca    9.1      2024 05:27  Phos  2.9       Mg     1.7         TPro  6.7  /  Alb  3.4[L]  /  TBili  1.9[H]  /  DBili  x   /  AST  496[H]  /  ALT  795[H]  /  AlkPhos  406[H]      LIVER FUNCTIONS - ( 2024 05:27 )  Alb: 3.4 g/dL / Pro: 6.7 g/dL / ALK PHOS: 406 U/L / ALT: 795 U/L DA / AST: 496 U/L / GGT: x           PT/INR - ( 2024 08:02 )   PT: 10.4 sec;   INR: 0.90 ratio         PTT - ( 2024 08:02 )  PTT:27.4 sec  ____________________________________________    IMAGING:    < from: CT Abdomen and Pelvis w/ IV Cont (24 @ 04:34) >  IMPRESSION:    Distended gallbladder containing multiple gallstones. No significant wall   thickening or hyperemia. Consider right upper quadrant ultrasound exclude   gallbladder disease.    Indeterminate left renal hypodense lesion measuring 2.8 x 2.3 cm.   Consider nonemergent ultrasound or MR to exclude neoplasm.        This note and its recommendations herein are preliminary until such time as cosigned by an attending.

## 2024-11-18 NOTE — CONSULT NOTE ADULT - ASSESSMENT
69y F with PMH of HTN, HLD, DM, CKD3, Cervical radiculopathy, Gall stones, Hepatic steatosis, and Osteopenia presenting with abdominal pain. GI consulted for gall stone pancreatitis 69y F with PMH of HTN, HLD, DM (A1C 9.8)  Cervical radiculopathy, Gall stones, Hepatic steatosis, and Osteopenia presenting with abdominal pain. GI consulted for gallstone pancreatitis

## 2024-11-18 NOTE — PROGRESS NOTE ADULT - PROBLEM SELECTOR PLAN 4
hx of CKD3   p/w Creatinine: 0.99-1.07; eGFR 57-62  baseline creatinine 1.19; baseline eGFR 47  -CT A/P shows Indeterminate left renal hypodense lesion measuring 2.8 x 2.3 cm  -f/u renal US to exclude neoplasm  -avoid nephrotoxic agents (judicious use of toradol for moderate pain)  -trend BMP daily

## 2024-11-18 NOTE — PROGRESS NOTE ADULT - SUBJECTIVE AND OBJECTIVE BOX
PGY-2 Progress Note discussed with attending      PLEASE CONTACT ON CALL TEAM:  - On Call Team (Please refer to [place]) FROM 5:00 PM - 8:30PM  - Nightfloat Team FROM 8:30 -7:30 AM    INTERVAL HPI/OVERNIGHT EVENTS: No acute events overnight.  Patient examined at bedside this AM.  Patient denies acute complaints.  Patients daughter at bedside requesting to translate for patient.  Patient denies current pain and is tolerating diet.      REVIEW OF SYSTEMS:  CONSTITUTIONAL: No fever, weight loss, or fatigue  RESPIRATORY: No cough, wheezing, chills or hemoptysis; No shortness of breath  CARDIOVASCULAR: No chest pain, palpitations, dizziness, or leg swelling  GASTROINTESTINAL: No abdominal pain. No nausea, vomiting, or hematemesis; No diarrhea or constipation. No melena or hematochezia.  GENITOURINARY: No dysuria or hematuria, urinary frequency  NEUROLOGICAL: No headaches, memory loss, loss of strength, numbness, or tremors  SKIN: No itching, burning, rashes, or lesions     MEDICATIONS  (STANDING):  influenza  Vaccine (HIGH DOSE) 0.5 milliLiter(s) IntraMuscular once  insulin lispro (ADMELOG) corrective regimen sliding scale   SubCutaneous every 6 hours  lactated ringers. 1000 milliLiter(s) (105 mL/Hr) IV Continuous <Continuous>  metoprolol succinate ER 50 milliGRAM(s) Oral daily  naloxone Injectable 0.4 milliGRAM(s) IV Push once  polyethylene glycol 3350 17 Gram(s) Oral daily  senna 2 Tablet(s) Oral at bedtime    MEDICATIONS  (PRN):  bisacodyl 5 milliGRAM(s) Oral daily PRN Constipation  HYDROmorphone  Injectable 0.5 milliGRAM(s) IV Push every 4 hours PRN Severe Pain (7 - 10)  ketorolac   Injectable 15 milliGRAM(s) IV Push every 6 hours PRN Moderate Pain (4 - 6)  ondansetron Injectable 4 milliGRAM(s) IV Push every 8 hours PRN Nausea and/or Vomiting      Vital Signs Last 24 Hrs  T(C): 36.7 (18 Nov 2024 05:43), Max: 37.1 (17 Nov 2024 11:05)  T(F): 98.1 (18 Nov 2024 05:43), Max: 98.8 (17 Nov 2024 13:58)  HR: 65 (18 Nov 2024 05:43) (65 - 78)  BP: 152/78 (18 Nov 2024 05:43) (139/77 - 161/72)  BP(mean): --  RR: 17 (18 Nov 2024 05:43) (16 - 18)  SpO2: 96% (18 Nov 2024 05:43) (95% - 97%)    Parameters below as of 18 Nov 2024 05:43  Patient On (Oxygen Delivery Method): room air        PHYSICAL EXAMINATION:  GENERAL: NAD, sitting up in bed comfortably  HEAD:  Atraumatic, Normocephalic  EYES: EOMI, PERRLA, conjunctiva and sclera clear  ENT: Moist mucous membranes  NECK: Supple, No JVD  CHEST/LUNG: Clear to auscultation bilaterally; No rales, rhonchi, wheezing, or rubs. Unlabored respirations  HEART: Regular rate and rhythm; No murmurs, rubs, or gallops  ABDOMEN: BSx4; Soft, tender to palpation over epigastrium and RUQ, no rebound or guarding, mildly distended  EXTREMITIES:  2+ Peripheral Pulses, brisk capillary refill. No clubbing, cyanosis, or edema  NERVOUS SYSTEM:  A&Ox3, no focal deficits   SKIN: No rashes or lesions                          11.9   5.68  )-----------( 197      ( 18 Nov 2024 05:27 )             34.6     11-18    141  |  106  |  12  ----------------------------<  150[H]  3.3[L]   |  29  |  0.79    Ca    9.1      18 Nov 2024 05:27  Phos  2.9     11-18  Mg     1.7     11-18    TPro  6.7  /  Alb  3.4[L]  /  TBili  1.9[H]  /  DBili  x   /  AST  496[H]  /  ALT  795[H]  /  AlkPhos  406[H]  11-18    LIVER FUNCTIONS - ( 18 Nov 2024 05:27 )  Alb: 3.4 g/dL / Pro: 6.7 g/dL / ALK PHOS: 406 U/L / ALT: 795 U/L DA / AST: 496 U/L / GGT: x               PT/INR - ( 17 Nov 2024 08:02 )   PT: 10.4 sec;   INR: 0.90 ratio         PTT - ( 17 Nov 2024 08:02 )  PTT:27.4 sec    I&O's Summary    17 Nov 2024 07:01  -  18 Nov 2024 07:00  --------------------------------------------------------  IN: 840 mL / OUT: 0 mL / NET: 840 mL          Culture - Blood (collected 17 Nov 2024 02:04)  Source: .Blood BLOOD  Preliminary Report (18 Nov 2024 09:01):    No growth at 24 hours    Culture - Blood (collected 17 Nov 2024 02:04)  Source: .Blood BLOOD  Preliminary Report (18 Nov 2024 09:01):    No growth at 24 hours        CAPILLARY BLOOD GLUCOSE      RADIOLOGY & ADDITIONAL TESTS:

## 2024-11-18 NOTE — PROGRESS NOTE ADULT - ATTENDING COMMENTS
#Gallstone pancreatitis   #Transaminitis  #Type 2 DM   #CKD3  #Hepatic steatosis   #Gallstones   #HTN  #HLD   #DVT ppx     69yF with PMH of type 2 DM, known history of gallstones, hepatic steatosis, HTN, HLD, who presented from home with acute onset abdominal pain that started one day prior to admission. She states that patient has a known history of gallstones. Admitted for gallstone pancreatitis. Patient seen and examined at bedside. No acute events overnight. Daughter present to provide translation. Patient states that her abdominal pain is resolved. Tolerating CLD.     -lipase >2k  -tolerating CLD, will be NPO at midnight   -PRN zofran  -pain control - toradol, dilaudid per pain scale  -lactate normalized  -acetaminophen level wnl  -LFTs likely elevated in setting of gallstone pancreatitis   -ACHS FS, ISS   -CKD likely in setting of chronic DM  -GI following  -surgery consulted  -f/u MRCP  -DVT ppx #Gallstone pancreatitis   #Transaminitis  #Type 2 DM   #CKD3  #Hepatic steatosis   #Gallstones   #HTN  #HLD   #DVT ppx     69yF with PMH of type 2 DM, known history of gallstones, hepatic steatosis, HTN, HLD, who presented from home with acute onset abdominal pain that started one day prior to admission. She states that patient has a known history of gallstones. Admitted for gallstone pancreatitis. Patient seen and examined at bedside. No acute events overnight. Daughter present to provide translation. Patient states that her abdominal pain is resolved. Tolerating CLD.     -lipase >2k  -tolerating CLD  -plan for robot cl 11/20, pending results of MRCP - d/w surgery Dr. Mcgrath  -TELMA bee  -pain control - toradol, dilaudid per pain scale  -lactate normalized  -acetaminophen level wnl  -LFTs likely elevated in setting of gallstone pancreatitis   -ACHS FS, ISS   -CKD likely in setting of chronic DM  -GI, surgery following  -f/u MRCP  -DVT ppx

## 2024-11-18 NOTE — CONSULT NOTE ADULT - PROBLEM SELECTOR RECOMMENDATION 9
-CTAP noted above, distended gallbladder containing multiple gallstones. No significant wall   thickening or hyperemia. CBD normal caliber  -Trend LFTs, downtrending  -Trend TBili, up-trending  -f/u MRCP  -surgery consult noted, Lap cl for 11/19/24    This note and its recommendations herein are preliminary until such time as cosigned by an attending.

## 2024-11-18 NOTE — PROGRESS NOTE ADULT - PROBLEM SELECTOR PLAN 1
hx of gall stones p/w acute post prandial abdominal pain  -Lipase 2342, Lactate 4.5-->3.5   -CT A/P shows distended gallbladder containing multiple gallstones largest measuring without significant wall thickening or hyperemia. Pancreas within normal limits.   -c/w moderate IV fluid hydration protocol (1.5cc/kg) - LR 105cc/hr  -Given mostly hepatocellular component of liver injury in s/o high doses of metformin and GLP-1 agonist pancreatitis may also be related to medications  -f/u serum triglycerides  -trend lactate to normal  - Clear liquid diet for now, NPO after midnight for planned surgery 11/19  - zofran 4mg ivp q8 prn n/v  -pain management - toradol 15mg IV q6 (moderate), dilaudid 0.5mg IV q4 (severe)  - F/U MRCP  -Surgery consulted, planned surgery 11/19  -GI consulted hx of gall stones p/w acute post prandial abdominal pain  -Lipase 2342, Lactate 4.5-->3.5   -CT A/P shows distended gallbladder containing multiple gallstones largest measuring without significant wall thickening or hyperemia. Pancreas within normal limits.   -c/w moderate IV fluid hydration protocol (1.5cc/kg) - LR 105cc/hr  -Given mostly hepatocellular component of liver injury in s/o high doses of metformin and GLP-1 agonist pancreatitis may also be related to medications  -f/u serum triglycerides  -trend lactate to normal  - Clear liquid diet for now, NPO after midnight for planned surgery 11/19  - zofran 4mg ivp q8 prn n/v  -pain management - toradol 15mg IV q6 (moderate), dilaudid 0.5mg IV q4 (severe)  - F/U MRCP  - RCRI: 1 points Class II Risk, 6% 30-day risk of death, MI, or cardiac arrest  - HORNER: 0.0 % Risk of myocardial infarction or cardiac arrest, intraoperatively or up to 30 days post-op  - METS > 4  - Patient is low to intermediate risk for Robotic laparoscopic cholecystectomy  -Surgery consulted, planned surgery 11/19  -GI consulted

## 2024-11-18 NOTE — PHARMACOTHERAPY INTERVENTION NOTE - COMMENTS
68y/o pt that needs clarification if amlodipine 5 mg is a home med (CCB with beta blocker simultaneous use verification). Would also recommended restarting enalapril 20 mg PO BID (home med) as BP is elevated and if no contraindications.

## 2024-11-18 NOTE — CONSULT NOTE ADULT - NS ATTEND AMEND GEN_ALL_CORE FT
69F presenting with epigastric and RUQ tenderness for 36 hours days, nausea. Hx of diabetes mellitus type 2 on metformin, CKD found to have pancreatitis secondary to elevated lipase levels    CT: distended gallbladder with large gallstone  Lipase 2K  Tbili 1.2  wbc 11k  On exam there is RUQ tenderness, mild epigastric tenderness  no jaundice    Acute gallstone pancreatitis  - clears as tolerated  - trend lfts, if worsening tbili may need preop MRCP  - please document medical risk stratification  - OR 11/19 for Robotic cholecystectomy  - d/w family, patient at bedside
69y F with PMH of HTN, HLD, DM (A1C 9.8)  Cervical radiculopathy, Gall stones, Hepatic steatosis, and Osteopenia presenting with abdominal pain. GI consulted for gallstone pancreatitis. Pt can tolerate liquids but has severe pain with solid food.   Plan  agree with MRCP   lap cl per surgical plan - no ERCP needed pre-OR since CBD appears normal  Not sure if antibiotics needed  please trend CRP daily  Pt would benefit from colon cancer screening as outpatient

## 2024-11-18 NOTE — PROGRESS NOTE ADULT - SUBJECTIVE AND OBJECTIVE BOX
Lydia: [id number]  Patient seen and examined at bedside with no complaints.   Denies pain, nausea/ vomiting.   Tolerating clear liquid diet.    Vital Signs Last 24 Hrs  T(F): 98.1 (11-18-24 @ 05:43), Max: 98.8 (11-17-24 @ 13:58)  HR: 65 (11-18-24 @ 05:43)  BP: 152/78 (11-18-24 @ 05:43)  RR: 17 (11-18-24 @ 05:43)  SpO2: 96% (11-18-24 @ 05:43)  POCT Blood Glucose.: 149 mg/dL (18 Nov 2024 06:15)    GENERAL: Alert, NAD  CHEST/LUNG: respirations nonlabored  ABDOMEN: soft, minimal RUQ tenderness to palpation, Nondistended  EXTREMITIES:  no calf tenderness, No edema    I&O's Detail    17 Nov 2024 07:01  -  18 Nov 2024 07:00  --------------------------------------------------------  IN:    Lactated Ringers: 840 mL  Total IN: 840 mL    OUT:  Total OUT: 0 mL    Total NET: 840 mL    LABS:                        11.9   5.68  )-----------( 197      ( 18 Nov 2024 05:27 )             34.6     11-18    141  |  106  |  12  ----------------------------<  150[H]  3.3[L]   |  29  |  0.79    Ca    9.1      18 Nov 2024 05:27  Phos  2.9     11-18  Mg     1.7     11-18    TPro  6.7  /  Alb  3.4[L]  /  TBili  1.9[H]  /  DBili  x   /  AST  496[H]  /  ALT  795[H]  /  AlkPhos  406[H]  11-18    PT/INR - ( 17 Nov 2024 08:02 )   PT: 10.4 sec;   INR: 0.90 ratio      PTT - ( 17 Nov 2024 08:02 )  PTT:27.4 sec

## 2024-11-18 NOTE — PROGRESS NOTE ADULT - PROBLEM SELECTOR PLAN 3
h/o DM on insulin-GLP1 combination, metformin (+metformax), farxiga at home  - hold oral dm meds  -f/u A1c  -On glargine/lixesenatide 45U qAM at home  -c/w moderate sliding scale   -Adjust insulin as indicated  -FS q6 while NPO  -Carb steady diet when no longer NPO

## 2024-11-18 NOTE — PROGRESS NOTE ADULT - ASSESSMENT
69 year old female with gallstone pancreatitis, Tbili uptrending to 1.9 today    - consider GI consult and MRCP  - tentatively plan for Robotic laparoscopic cholecystectomy tomorrow  - continue to trend LFTs  - please provide medical clearance and optimization   - discuss with Dr. Mcgrath 69 year old female with gallstone pancreatitis, Tbili uptrending to 1.9 today    - recommend MRCP  - consider GI consult  - tentatively plan for Robotic laparoscopic cholecystectomy Wednesday  - continue to trend LFTs  - please provide medical clearance and optimization   - discuss with Dr. Mcgrath

## 2024-11-19 LAB
ANA TITR SER: NEGATIVE — SIGNIFICANT CHANGE UP
ANION GAP SERPL CALC-SCNC: 8 MMOL/L — SIGNIFICANT CHANGE UP (ref 5–17)
APTT BLD: 32.1 SEC — SIGNIFICANT CHANGE UP (ref 24.5–35.6)
BILIRUB SERPL-MCNC: 0.7 MG/DL — SIGNIFICANT CHANGE UP (ref 0.2–1.2)
BLD GP AB SCN SERPL QL: SIGNIFICANT CHANGE UP
BUN SERPL-MCNC: 11 MG/DL — SIGNIFICANT CHANGE UP (ref 7–18)
CALCIUM SERPL-MCNC: 9.8 MG/DL — SIGNIFICANT CHANGE UP (ref 8.4–10.5)
CHLORIDE SERPL-SCNC: 107 MMOL/L — SIGNIFICANT CHANGE UP (ref 96–108)
CO2 SERPL-SCNC: 27 MMOL/L — SIGNIFICANT CHANGE UP (ref 22–31)
CREAT SERPL-MCNC: 1.1 MG/DL — SIGNIFICANT CHANGE UP (ref 0.5–1.3)
EGFR: 55 ML/MIN/1.73M2 — LOW
GLUCOSE BLDC GLUCOMTR-MCNC: 202 MG/DL — HIGH (ref 70–99)
GLUCOSE BLDC GLUCOMTR-MCNC: 220 MG/DL — HIGH (ref 70–99)
GLUCOSE BLDC GLUCOMTR-MCNC: 223 MG/DL — HIGH (ref 70–99)
GLUCOSE BLDC GLUCOMTR-MCNC: 249 MG/DL — HIGH (ref 70–99)
GLUCOSE BLDC GLUCOMTR-MCNC: 285 MG/DL — HIGH (ref 70–99)
GLUCOSE SERPL-MCNC: 256 MG/DL — HIGH (ref 70–99)
HCT VFR BLD CALC: 38.4 % — SIGNIFICANT CHANGE UP (ref 34.5–45)
HGB BLD-MCNC: 12.8 G/DL — SIGNIFICANT CHANGE UP (ref 11.5–15.5)
INR BLD: 0.91 RATIO — SIGNIFICANT CHANGE UP (ref 0.85–1.16)
LKM AB SER-ACNC: <20.1 UNITS — SIGNIFICANT CHANGE UP (ref 0–20)
MAGNESIUM SERPL-MCNC: 1.7 MG/DL — SIGNIFICANT CHANGE UP (ref 1.6–2.6)
MCHC RBC-ENTMCNC: 30.8 PG — SIGNIFICANT CHANGE UP (ref 27–34)
MCHC RBC-ENTMCNC: 33.3 G/DL — SIGNIFICANT CHANGE UP (ref 32–36)
MCV RBC AUTO: 92.5 FL — SIGNIFICANT CHANGE UP (ref 80–100)
MELD SCORE WITH DIALYSIS: 20 POINTS — SIGNIFICANT CHANGE UP
MELD SCORE WITHOUT DIALYSIS: 7 POINTS — SIGNIFICANT CHANGE UP
MITOCHONDRIA AB SER-ACNC: SIGNIFICANT CHANGE UP
NRBC # BLD: 0 /100 WBCS — SIGNIFICANT CHANGE UP (ref 0–0)
PHOSPHATE SERPL-MCNC: 3.3 MG/DL — SIGNIFICANT CHANGE UP (ref 2.5–4.5)
PLATELET # BLD AUTO: 224 K/UL — SIGNIFICANT CHANGE UP (ref 150–400)
POTASSIUM SERPL-MCNC: 3.9 MMOL/L — SIGNIFICANT CHANGE UP (ref 3.5–5.3)
POTASSIUM SERPL-SCNC: 3.9 MMOL/L — SIGNIFICANT CHANGE UP (ref 3.5–5.3)
PROTHROM AB SERPL-ACNC: 10.6 SEC — SIGNIFICANT CHANGE UP (ref 9.9–13.4)
RBC # BLD: 4.15 M/UL — SIGNIFICANT CHANGE UP (ref 3.8–5.2)
RBC # FLD: 12.5 % — SIGNIFICANT CHANGE UP (ref 10.3–14.5)
SMOOTH MUSCLE AB SER-ACNC: SIGNIFICANT CHANGE UP
SODIUM SERPL-SCNC: 142 MMOL/L — SIGNIFICANT CHANGE UP (ref 135–145)
WBC # BLD: 6.67 K/UL — SIGNIFICANT CHANGE UP (ref 3.8–10.5)
WBC # FLD AUTO: 6.67 K/UL — SIGNIFICANT CHANGE UP (ref 3.8–10.5)

## 2024-11-19 PROCEDURE — 99232 SBSQ HOSP IP/OBS MODERATE 35: CPT | Mod: GC

## 2024-11-19 PROCEDURE — 99223 1ST HOSP IP/OBS HIGH 75: CPT | Mod: 25

## 2024-11-19 RX ORDER — GLUCAGON INJECTION, SOLUTION 0.5 MG/.1ML
1 INJECTION, SOLUTION SUBCUTANEOUS ONCE
Refills: 0 | Status: DISCONTINUED | OUTPATIENT
Start: 2024-11-19 | End: 2024-11-20

## 2024-11-19 RX ORDER — INSULIN GLARGINE 100 [IU]/ML
5 INJECTION, SOLUTION SUBCUTANEOUS AT BEDTIME
Refills: 0 | Status: DISCONTINUED | OUTPATIENT
Start: 2024-11-19 | End: 2024-11-20

## 2024-11-19 RX ORDER — 0.9 % SODIUM CHLORIDE 0.9 %
1000 INTRAVENOUS SOLUTION INTRAVENOUS
Refills: 0 | Status: DISCONTINUED | OUTPATIENT
Start: 2024-11-19 | End: 2024-11-20

## 2024-11-19 RX ADMIN — Medication 6: at 12:12

## 2024-11-19 RX ADMIN — Medication 100 MILLILITER(S): at 12:12

## 2024-11-19 RX ADMIN — Medication 100 MILLILITER(S): at 22:00

## 2024-11-19 RX ADMIN — Medication 4: at 06:04

## 2024-11-19 RX ADMIN — METRONIDAZOLE 100 MILLIGRAM(S): 500 TABLET ORAL at 14:00

## 2024-11-19 RX ADMIN — METOPROLOL TARTRATE 50 MILLIGRAM(S): 100 TABLET, FILM COATED ORAL at 05:51

## 2024-11-19 RX ADMIN — Medication 4: at 17:28

## 2024-11-19 RX ADMIN — METRONIDAZOLE 100 MILLIGRAM(S): 500 TABLET ORAL at 05:51

## 2024-11-19 RX ADMIN — Medication 100 MILLIGRAM(S): at 12:12

## 2024-11-19 RX ADMIN — INSULIN GLARGINE 5 UNIT(S): 100 INJECTION, SOLUTION SUBCUTANEOUS at 21:59

## 2024-11-19 RX ADMIN — METRONIDAZOLE 100 MILLIGRAM(S): 500 TABLET ORAL at 22:00

## 2024-11-19 RX ADMIN — Medication 2 TABLET(S): at 21:59

## 2024-11-19 NOTE — PROGRESS NOTE ADULT - SUBJECTIVE AND OBJECTIVE BOX
PGY-1 Progress Note discussed with attending    PAGER #: Author on Teams TILL 5:00 PM  PLEASE CONTACT ON CALL TEAM:  - On Call Team (Please refer to Lavern) FROM 5:00 PM - 8:30PM  - Nightfloat Team FROM 8:30 -7:30 AM    CHIEF COMPLAINT & BRIEF HOSPITAL COURSE:    INTERVAL HPI/OVERNIGHT EVENTS: No acute overnight events. Patient reports no pain today. She is aware her surgery will be tomorrow      REVIEW OF SYSTEMS:  CONSTITUTIONAL: No fever, weight loss, or fatigue  RESPIRATORY: No cough, wheezing, chills or hemoptysis; No shortness of breath  CARDIOVASCULAR: No chest pain, palpitations, dizziness, or leg swelling  GASTROINTESTINAL: No abdominal pain. No nausea, vomiting, or hematemesis; No diarrhea or constipation. No melena or hematochezia.  GENITOURINARY: No dysuria or hematuria, urinary frequency  NEUROLOGICAL: No headaches, memory loss, loss of strength, numbness, or tremors  SKIN: No itching, burning, rashes, or lesions     Vital Signs Last 24 Hrs  T(C): 36.6 (19 Nov 2024 13:34), Max: 36.6 (19 Nov 2024 05:04)  T(F): 97.9 (19 Nov 2024 13:34), Max: 97.9 (19 Nov 2024 05:04)  HR: 72 (19 Nov 2024 13:34) (66 - 72)  BP: 151/83 (19 Nov 2024 13:34) (130/74 - 151/83)  BP(mean): --  RR: 17 (19 Nov 2024 13:34) (17 - 18)  SpO2: 97% (19 Nov 2024 13:34) (95% - 97%)    Parameters below as of 19 Nov 2024 13:34  Patient On (Oxygen Delivery Method): room air        PHYSICAL EXAMINATION:  GENERAL: NAD  HEAD:  Atraumatic, Normocephalic  EYES:  conjunctiva and sclera clear  NECK: Supple, No JVD, Normal thyroid  CHEST/LUNG: Clear to auscultation. Clear to percussion bilaterally; No rales, rhonchi, wheezing, or rubs  HEART: Regular rate and rhythm; No murmurs, rubs, or gallops  ABDOMEN: Soft, Nontender, Nondistended; Bowel sounds present  NERVOUS SYSTEM:  Alert & Oriented X3,    EXTREMITIES:  2+ Peripheral Pulses, No clubbing, cyanosis, or edema  SKIN: warm dry                          12.8   6.67  )-----------( 224      ( 19 Nov 2024 08:40 )             38.4     11-19    142  |  107  |  11  ----------------------------<  256[H]  3.9   |  27  |  1.10    Ca    9.8      19 Nov 2024 08:40  Phos  3.3     11-19  Mg     1.7     11-19    TPro  x   /  Alb  x   /  TBili  0.7  /  DBili  x   /  AST  x   /  ALT  x   /  AlkPhos  x   11-19    LIVER FUNCTIONS - ( 18 Nov 2024 05:27 )  Alb: 3.4 g/dL / Pro: 6.7 g/dL / ALK PHOS: 406 U/L / ALT: 795 U/L DA / AST: 496 U/L / GGT: x               PT/INR - ( 19 Nov 2024 08:40 )   PT: 10.6 sec;   INR: 0.91 ratio         PTT - ( 19 Nov 2024 08:40 )  PTT:32.1 sec    CAPILLARY BLOOD GLUCOSE      RADIOLOGY & ADDITIONAL TESTS:

## 2024-11-19 NOTE — PROGRESS NOTE ADULT - PROBLEM SELECTOR PLAN 4
hx of CKD3   p/w Creatinine: 0.99-1.07; eGFR 57-62  baseline creatinine 1.19; baseline eGFR 47  -CT A/P shows Indeterminate left renal hypodense lesion measuring 2.8 x 2.3 cm  -f/u renal US to exclude neoplasm  -avoid nephrotoxic agents (judicious use of toradol for moderate pain)  -trend BMP daily hx of CKD3   p/w Creatinine: 0.99-1.07; eGFR 57-62  baseline creatinine 1.19; baseline eGFR 47  -CT A/P shows Indeterminate left renal hypodense lesion measuring 2.8 x 2.3 cm  -f/u renal US to exclude neoplasm      -MRI:  2.7 cm hemorrhagic or proteinaceous cyst in the left kidney  -avoid nephrotoxic agents (judicious use of toradol for moderate pain)  -trend BMP daily

## 2024-11-19 NOTE — PROGRESS NOTE ADULT - ASSESSMENT
69 year old female with gallstone pancreatitis.  last Tbili 1.9   mrcp negative for choledocholithiasis    - plan for Robotic laparoscopic cholecystectomy Wednesday 11/20  - npo md  - cont iv abx and iv fluids  - continue to trend LFTs  - medical clearance noted

## 2024-11-19 NOTE — PROGRESS NOTE ADULT - PROBLEM SELECTOR PLAN 1
hx of gall stones p/w acute post prandial abdominal pain  -Lipase 2342, Lactate 4.5-->3.5   -CT A/P shows distended gallbladder containing multiple gallstones largest measuring without significant wall thickening or hyperemia. Pancreas within normal limits.   -c/w moderate IV fluid hydration protocol (1.5cc/kg) - LR 105cc/hr  -Given mostly hepatocellular component of liver injury in s/o high doses of metformin and GLP-1 agonist pancreatitis may also be related to medications  -f/u serum triglycerides  -trend lactate to normal  - Clear liquid diet for now, NPO after midnight for planned surgery 11/20  - zofran 4mg ivp q8 prn n/v  -pain management - toradol 15mg IV q6 (moderate), dilaudid 0.5mg IV q4 (severe)  - F/U MRCP- no acute pancreatitis or necrosis. no evidence of choledocholithiasis  - RCRI: 1 points Class II Risk, 6% 30-day risk of death, MI, or cardiac arrest  - HORNER: 0.0 % Risk of myocardial infarction or cardiac arrest, intraoperatively or up to 30 days post-op  - METS > 4  - Patient is low to intermediate risk for Robotic laparoscopic cholecystectomy  -Surgery consulted, planned surgery 11/19  -GI consulted hx of gall stones p/w acute post prandial abdominal pain  -Lipase 2342, Lactate 4.5-->3.5   -CT A/P shows distended gallbladder containing multiple gallstones largest measuring without significant wall thickening or hyperemia. Pancreas within normal limits.   -c/w moderate IV fluid hydration protocol (1.5cc/kg) - LR 105cc/hr  -Given mostly hepatocellular component of liver injury in s/o high doses of metformin and GLP-1 agonist pancreatitis may also be related to medications  -f/u serum triglycerides  -trend lactate to normal  - Clear liquid diet for now, NPO after midnight for planned surgery 11/20  - zofran 4mg ivp q8 prn n/v  -pain management - toradol 15mg IV q6 (moderate), dilaudid 0.5mg IV q4 (severe)  - F/U MRCP- no acute pancreatitis or necrosis. no evidence of choledocholithiasis  - RCRI: 1 points Class II Risk, 6% 30-day risk of death, MI, or cardiac arrest  - HORNER: 0.0 % Risk of myocardial infarction or cardiac arrest, intraoperatively or up to 30 days post-op  - METS > 4  - Patient is low to intermediate risk for Robotic laparoscopic cholecystectomy  -Surgery consulted, planned surgery 11/20  -GI consulted- no ERCP needed pre-OR since CBD appears normal

## 2024-11-19 NOTE — PROGRESS NOTE ADULT - PROBLEM SELECTOR PLAN 3
h/o DM on insulin-GLP1 combination, metformin (+metformax), farxiga at home  - hold oral dm meds  -f/u A1c  -On glargine/lixesenatide 45U qAM at home  -c/w moderate sliding scale   -Adjust insulin as indicated  -FS q6 while NPO  -Carb steady diet when no longer NPO h/o DM on insulin-GLP1 combination, metformin (+metformax), farxiga at home  - hold oral dm meds  -f/u A1c- 9.8  -On glargine/lixesenatide 45U qAM at home  -c/w moderate sliding scale   -Adjust insulin as indicated  -FS q6 while NPO  -Carb steady diet when no longer NPO

## 2024-11-19 NOTE — PROGRESS NOTE ADULT - SUBJECTIVE AND OBJECTIVE BOX
INTERVAL HPI/OVERNIGHT EVENTS: Pt seen and examined at bedside. Patient accompanied by daughter at bedside providing polish translation.     Vital Signs Last 24 Hrs  T(C): 36.6 (19 Nov 2024 05:04), Max: 36.6 (19 Nov 2024 05:04)  T(F): 97.9 (19 Nov 2024 05:04), Max: 97.9 (19 Nov 2024 05:04)  HR: 66 (19 Nov 2024 05:04) (66 - 71)  BP: 143/73 (19 Nov 2024 05:04) (130/74 - 147/81)  BP(mean): --  RR: 18 (19 Nov 2024 05:04) (18 - 18)  SpO2: 96% (19 Nov 2024 05:04) (95% - 96%)    Parameters below as of 19 Nov 2024 05:04  Patient On (Oxygen Delivery Method): room air      I&O's Detail      Medications:  bisacodyl 5 milliGRAM(s) Oral daily PRN  cefTRIAXone   IVPB 1000 milliGRAM(s) IV Intermittent every 24 hours  metroNIDAZOLE  IVPB      metroNIDAZOLE  IVPB 500 milliGRAM(s) IV Intermittent every 8 hours  naloxone Injectable 0.4 milliGRAM(s) IV Push once  polyethylene glycol 3350 17 Gram(s) Oral daily  senna 2 Tablet(s) Oral at bedtime      Physical Exam:  General: AAOx3, No acute distress  HEENT: NC/AT, trachea midline  Respiratory: Nonlabored breathing, equal chest rise b/l   Abdomen: soft,  nondistended, nontender, no rebound tenderness, no guarding, no palpable masses        Labs:                        12.8   6.67  )-----------( 224      ( 19 Nov 2024 08:40 )             38.4     11-18    141  |  106  |  12  ----------------------------<  150[H]  3.3[L]   |  29  |  0.79    Ca    9.1      18 Nov 2024 05:27  Phos  2.9     11-18  Mg     1.7     11-18    TPro  6.7  /  Alb  3.4[L]  /  TBili  1.9[H]  /  DBili  x   /  AST  496[H]  /  ALT  795[H]  /  AlkPhos  406[H]  11-18        RADIOLOGY & ADDITIONAL STUDIES:  < from: CT Abdomen and Pelvis w/ IV Cont (11.17.24 @ 04:34) >    ACC: [id number] EXAM:  CT ABDOMEN AND PELVIS IC   ORDERED BY:  MAX LAZARUS     PROCEDURE DATE:  11/17/2024          INTERPRETATION:  CLINICAL INFORMATION: Right upper quadrant abdominal   pain.    COMPARISON: None.    CONTRAST/COMPLICATIONS:  IV Contrast: Omnipaque 350  90 cc administered   10 cc discarded  Oral Contrast: NONE      PROCEDURE:  CT of the Abdomen and Pelvis was performed.  Sagittal and coronal reformats were performed.    FINDINGS:  LOWER CHEST: Mild groundglass attenuation in the lingula. No   consolidation or pleural effusion. Mitral valvular and coronary artery   calcifications.    LIVER: Within normal limits.  BILE DUCTS: Normal caliber.  GALLBLADDER: Distended containing multiple gallstones largest measuring   up to 2.7 cm. No significant wall thickening or hyperemia.  SPLEEN: Sub-cm splenic calcification.  PANCREAS: Within normal limits.  ADRENALS: Within normal limits.  KIDNEYS/URETERS: Trace nonspecific bilateral perinephric stranding   without hydronephrosis. Indeterminate left renal hypodense lesion   measuring 2.8 x 2.3 cm. Consider nonemergent ultrasound or MR to exclude   neoplasm.    BLADDER: Within normal limits.  REPRODUCTIVE ORGANS: Punctate intrauterine calcifications, likely   calcified fibroid.    BOWEL: No bowel obstruction. Normal appendix.  PERITONEUM/RETROPERITONEUM: Within normal limits.  VESSELS: Atherosclerotic changes.  LYMPH NODES: No lymphadenopathy.  ABDOMINAL WALL: Small bilateral groin hernias containing fat.  BONES: Degenerative changes. Mild compression deformity superior endplate   of T11.    IMPRESSION:    Distended gallbladder containing multiple gallstones. No significant wall   thickening or hyperemia. Consider right upper quadrant ultrasound exclude   gallbladder disease.    Indeterminate left renal hypodense lesion measuring 2.8 x 2.3 cm.   Consider nonemergent ultrasound or MR to exclude neoplasm.    These results were discussed via telephone at 11/17/2024 4:46 AM by Dr. Martin of radiology with Dr. Lazarus.    ---End of Report ---    < end of copied text >

## 2024-11-19 NOTE — PROGRESS NOTE ADULT - ATTENDING COMMENTS
A/p# Acute Gall stone Pancreatitis # Uncontrolled Type 2 DM # HTN    -Daughter Zenaida at bedside interpreted polish.,   patient denies any n/v/abd pain. tolerated clear liquid diet well. Denies any cardiac s/s. at baseline mets>4  o/e nad comfortable, cta bl, s1s2+rrr, soft bs+ntnd, ao3 non focal  surgery consult appreciated- plan for OR tomorrow for cholecystectomy  -Endo consult for uncontrolled DM, [id number]  - cbc, bmp , lfts, mg phos reviewed. check cbc, bmp mg phos lft sin the morning  -c./w iv fluids  -npo p mn  -c.w iv ATBx  care plan d/w daughter at bedside.

## 2024-11-19 NOTE — CONSULT NOTE ADULT - SUBJECTIVE AND OBJECTIVE BOX
Endocrine initial consult note:  69 year old Female who presents with a chief complaint of gall stone pancreatitis (2024 13:32)    HPI:  69y F with PMH of HTN, HLD, DM, CKD3, Cervical radiculopathy, Gall stones, Hepatic steatosis, and Osteopenia presenting with abdominal pain. History and collateral obtained from daughter, Betzaida, providing polish translation at patient's request. Reports after eating at 9 PM last night, patient developed acute severe right upper abdominal pain radiating to the back and lower abdomen. Daughter states that patient is "tough" and does not complain of pain often but this episode of pain was debilitating to the point that the patient could not walk and almost fainted. Patient describes pain as "heaviness" that was associated with nausea but no vomiting. Daughter reports that during and after this episode patient's abdomen seemed more distended and hard. Patient just returned from visiting [employer] for 5 months and reportedly had abdominal pain while she was there but not as severe. Patient states she has never experienced similar pain in the past. While in [place] patient  had an ultrasound and was told she had  "gallbladder mass " but daughter presumes she meant gall stones. On review of patient's current medications at bedside, patient seems to be taking a combination of medications prescribed in the [place] and [place], including [place] rx for metformin 850mg BID in addition to polish metformax 1000mg (i.e. metformin hydrochloride) once a day at lunchtime. Currently patient states that pain is improved however still present and reports that anything by mouth will exacerbate pain. Has not had bowel movement or passed gas yet today. Patient endorses mild cold like symptoms 2 weeks ago as well as chronic loose watery stools (1 per day) for the last year but otherwise has been in normal state of health. Denies fever, chills, headache, dizziness, chest pain, palpitations, shortness of breath, vomiting, diarrhea, constipation, and dysuria. In the ED: T(F): , Max: 99 (04:48); HR:  (70 - 74); BP:  (123/71 - 150/89); RR:  (16 - 17); SpO2:  (96% - 97%), WBC:10.90, Hb.9, PLT:251, Na:139, K:4.1, Cl:108, HCO3:22, BUN:22, sCr:0.99, Lactate: 4.5-->3.5   AST:1240, ALT:747, ALP:295, Tbili:1.3, Lipase:2342   CT A/P: Distended gallbladder containing multiple gallstones. No significant wall thickening or hyperemia. Indeterminate left renal hypodense lesion measuring 2.8 x 2.3 cm. s/p morphine  - Injectable 4 milliGRAM(s); ondansetron Injectable 4 milliGRAM(s); sodium chloride 0.9% Bolus 1000 milliLiter(s); sodium chloride 0.9% Bolus 1000 milliLiter(s); sodium chloride 0.9% Bolus 1000 milliLiter(s) (2024 08:27). Endocrinology is consulted for type 2 diabetes    Patient seen at the bedside with daughter who is helping in polish translation. Reports she was tolerate broth and liquid diet.     Diabetes History:  Diagnosis: Diagnosed with type 2 diabetes 10 years ago  Home regimen: Soliqua ( insulin glargine/ lixisenatide) 100/33 ( 40 units at bedtime), Metformin 850 mg twice a day and 1000 mg in the evening, Farxiga 5 mg daily  Home fingersticks: Does checks the sugars reports they are usually 200s. When the BS are 80s, she feels hypoglycemic symptoms. :  Hypoglycemia events: None, but at the level of 80 BS, she develops hypoglycemic symptoms  Retinopathy/most recent opthalmology: Yes, saw last year,  has likely non proliferative diabetic retinopathy  Peripheral Neuropathy: Denies  Nephropathy: Unknown  Cardiovascular disease: denies  Peripheral vascular disease: denies  Diet: Reports she eats high carbohydrate diet including juices, cakes, pasta and rice  Recent A1C: was around 8%  PCP: Diabetes is Managed by PCP  Endocrinologist: Never had     Review of systems:  Constitutional: No fever, yes weight loss, no fatigue, low energy, generalized weakness, poor appetiteg  Cardiovascular/ Respiratory: No palpitations,, no chest pain, no shortness of breath, no exercise intolerance, no cough, no leg/ ankle swelling  Gastrointestinal: No trouble swallowing, no heart burn, no abdominal pain, no bloating, no nausea, no vomiting, no constipation, no diarrhea, no frequent bowel movements  Skin: No excessive hair growth, no hair loss, no acne, no excessive sweating, no rash, no easy bruising  Neurological: No headaches, no change in vision, no dizziness/ lightheadedness, no tremors, no numbness/ tingling in feet, no pain/ burning in feet, no trouble with balance, no muscular weakness.   Endocrine: Frequent urination, excessive urination, excessive thirst, symptoms     PAST MEDICAL & SURGICAL HISTORY:  Diabetes      HLD (hyperlipidemia)      HTN (hypertension)      Stage 3 chronic kidney disease      Cervical radiculopathy      Gall stones      Osteopenia      Hepatic steatosis      2019 novel coronavirus disease (COVID-19)      History of colonoscopy          FAMILY HISTORY:  FH: heart disease (Mother)    FH: stroke (Father, Sibling)        Social History:  Occupation:  Marital status/Lives with  Exercise:  Tobacco:  Alcohol:  illicit drug abuse:  Health insurance status:    MEDICATIONS  (STANDING):  cefTRIAXone   IVPB 1000 milliGRAM(s) IV Intermittent every 24 hours  dextrose 5%. 1000 milliLiter(s) (50 mL/Hr) IV Continuous <Continuous>  dextrose 5%. 1000 milliLiter(s) (100 mL/Hr) IV Continuous <Continuous>  dextrose 50% Injectable 25 Gram(s) IV Push once  dextrose 50% Injectable 12.5 Gram(s) IV Push once  dextrose 50% Injectable 25 Gram(s) IV Push once  glucagon  Injectable 1 milliGRAM(s) IntraMuscular once  influenza  Vaccine (HIGH DOSE) 0.5 milliLiter(s) IntraMuscular once  insulin glargine Injectable (LANTUS) 5 Unit(s) SubCutaneous at bedtime  insulin lispro (ADMELOG) corrective regimen sliding scale   SubCutaneous three times a day before meals  insulin lispro (ADMELOG) corrective regimen sliding scale   SubCutaneous at bedtime  lactated ringers. 1000 milliLiter(s) (105 mL/Hr) IV Continuous <Continuous>  lactated ringers. 1000 milliLiter(s) (100 mL/Hr) IV Continuous <Continuous>  metoprolol succinate ER 50 milliGRAM(s) Oral daily  metroNIDAZOLE  IVPB      metroNIDAZOLE  IVPB 500 milliGRAM(s) IV Intermittent every 8 hours  naloxone Injectable 0.4 milliGRAM(s) IV Push once  polyethylene glycol 3350 17 Gram(s) Oral daily  senna 2 Tablet(s) Oral at bedtime    MEDICATIONS  (PRN):  bisacodyl 5 milliGRAM(s) Oral daily PRN Constipation  dextrose Oral Gel 15 Gram(s) Oral once PRN Blood Glucose LESS THAN 70 milliGRAM(s)/deciliter  HYDROmorphone  Injectable 0.5 milliGRAM(s) IV Push every 4 hours PRN Severe Pain (7 - 10)  ketorolac   Injectable 15 milliGRAM(s) IV Push every 6 hours PRN Moderate Pain (4 - 6)  ondansetron Injectable 4 milliGRAM(s) IV Push every 8 hours PRN Nausea and/or Vomiting      Physical Examination  Vital Signs Last 24 Hrs  T(C): 36.3 (2024 20:49), Max: 36.6 (2024 05:04)  T(F): 97.3 (2024 20:49), Max: 97.9 (2024 05:04)  HR: 66 (2024 20:49) (66 - 72)  BP: 159/66 (2024 20:49) (143/73 - 159/66)  BP(mean): --  RR: 18 (2024 20:49) (17 - 18)  SpO2: 96% (2024 20:49) (96% - 97%)    Parameters below as of 2024 20:49  Patient On (Oxygen Delivery Method): room air      Constitutional: No acute distress, ill- appearing, no anxious appearing, hyperkinetic, no diaphoretic  HEENT: Moist mucous membranes  Neck:  No JVD, bruits or thyromegaly, No thyroid nodules palpable, no LAD  Respiratory:  Respiratory effort normal, lungs clear to ausculation, without rales or rhonchi  Cardiovascular:  Regular heart rate, normal S1 and S2 sounds, without murmur, rub or gallop.  Gastrointestinal: Soft, non tender without hepatosplenomegaly and masses, no abdominal obesity  Extremities: Sensation intact to monofilament in feet, no cyanosis, clubbing or edema, positive pedal pulses  Neurological:  Oriented to person, place and time, No gross sensory or motor defects, visual fields intact to confrontation, normal deep tendon reflexes    Labs:                        12.8   6.67  )-----------( 224      ( 2024 08:40 )             38.4     -    142  |  107  |  11  ----------------------------<  256[H]  3.9   |  27  |  1.10    Ca    9.8      2024 08:40  Phos  3.3       Mg     1.7         TPro  x   /  Alb  x   /  TBili  0.7  /  DBili  x   /  AST  x   /  ALT  x   /  AlkPhos  x           PT/INR - ( 2024 08:40 )   PT: 10.6 sec;   INR: 0.91 ratio         PTT - ( 2024 08:40 )  PTT:32.1 sec  Urinalysis Basic - ( 2024 08:40 )    Color: x / Appearance: x / SG: x / pH: x  Gluc: 256 mg/dL / Ketone: x  / Bili: x / Urobili: x   Blood: x / Protein: x / Nitrite: x   Leuk Esterase: x / RBC: x / WBC x   Sq Epi: x / Non Sq Epi: x / Bacteria: x      CAPILLARY BLOOD GLUCOSE      POCT Blood Glucose.: 202 mg/dL (2024 21:19)  POCT Blood Glucose.: 223 mg/dL (2024 17:02)  POCT Blood Glucose.: 285 mg/dL (2024 11:36)  POCT Blood Glucose.: 220 mg/dL (2024 07:51)  POCT Blood Glucose.: 249 mg/dL (2024 06:01)  POCT Blood Glucose.: 231 mg/dL (2024 23:21)      Radiology and diagnostic studies:      Assessment and Plan:  69y Female   Endocrinology is consulted fro    1) Type 2 diabetes:      Recommendations:  Basal Insulin:   Glargine ( Lantus) units once daily    Nutritional Insulin:   Lispro (Admelog) units with breakfast, Hold if NPO or eating <50% of meals  Lispro ( Admelog) units with lunch, Hold if NPO or eating < 50% of meals  Lispro (Admelog) units with dinner, Hold if NPO or eating < 50% of meals    Correctional Insulin:  Normal Lispro ( Admelog) correctional scale with meals and bedtime    Oral Diabetes Medications:  Non in the hospital     Endocrine initial consult note:  69 year old Female who presents with a chief complaint of gall stone pancreatitis (2024 13:32)    HPI:  69y F with PMH of HTN, HLD, DM, CKD3, Cervical radiculopathy, Gall stones, Hepatic steatosis, and Osteopenia presenting with abdominal pain. History and collateral obtained from daughter, Betzaida, providing polish translation at patient's request. Reports after eating at 9 PM last night, patient developed acute severe right upper abdominal pain radiating to the back and lower abdomen. Daughter states that patient is "tough" and does not complain of pain often but this episode of pain was debilitating to the point that the patient could not walk and almost fainted. Patient describes pain as "heaviness" that was associated with nausea but no vomiting. Daughter reports that during and after this episode patient's abdomen seemed more distended and hard. Patient just returned from visiting [employer] [place] for 5 months and reportedly had abdominal pain while she was there but not as severe. Patient states she has never experienced similar pain in the past. While in [place] patient  had an ultrasound and was told she had  "gallbladder mass " but daughter presumes she meant gall stones. On review of patient's current medications at bedside, patient seems to be taking a combination of medications prescribed in the [place] and [place], including [place] rx for metformin 850mg BID in addition to polish metformax 1000mg (i.e. metformin hydrochloride) once a day at lunchtime. Currently patient states that pain is improved however still present and reports that anything by mouth will exacerbate pain. Has not had bowel movement or passed gas yet today. Patient endorses mild cold like symptoms 2 weeks ago as well as chronic loose watery stools (1 per day) for the last year but otherwise has been in normal state of health. Denies fever, chills, headache, dizziness, chest pain, palpitations, shortness of breath, vomiting, diarrhea, constipation, and dysuria. In the ED: T(F): , Max: 99 (04:48); HR:  (70 - 74); BP:  (123/71 - 150/89); RR:  (16 - 17); SpO2:  (96% - 97%), WBC:10.90, Hb.9, PLT:251, Na:139, K:4.1, Cl:108, HCO3:22, BUN:22, sCr:0.99, Lactate: 4.5-->3.5   AST:1240, ALT:747, ALP:295, Tbili:1.3, Lipase:2342   CT A/P: Distended gallbladder containing multiple gallstones. No significant wall thickening or hyperemia. Indeterminate left renal hypodense lesion measuring 2.8 x 2.3 cm. s/p morphine  - Injectable 4 milliGRAM(s); ondansetron Injectable 4 milliGRAM(s); sodium chloride 0.9% Bolus 1000 milliLiter(s); sodium chloride 0.9% Bolus 1000 milliLiter(s); sodium chloride 0.9% Bolus 1000 milliLiter(s) (2024 08:27). Endocrinology is consulted for type 2 diabetes    Patient seen at the bedside with daughter who is helping in polish translation. Reports she was tolerate broth and liquid diet.     Diabetes History:  Diagnosis: Diagnosed with type 2 diabetes 10 years ago  Home regimen: Soliqua ( insulin glargine/ lixisenatide) 100/33 ( 40 units at bedtime), Metformin 850 mg twice a day and 1000 mg in the evening, Farxiga 5 mg daily  Home fingersticks: Does checks the sugars reports they are usually 200s. When the BS are 80s, she feels hypoglycemic symptoms. :  Hypoglycemia events: None, but at the level of 80 BS, she develops hypoglycemic symptoms  Retinopathy/most recent opthalmology: Yes, saw last year,  has likely non proliferative diabetic retinopathy  Peripheral Neuropathy: Denies  Nephropathy: Unknown  Cardiovascular disease: denies  Peripheral vascular disease: denies  Diet: Reports she eats high carbohydrate diet including juices, cakes, pasta and rice  Recent A1C: was around 8%  PCP: Diabetes is Managed by PCP  Endocrinologist: Never had     Review of systems:  Constitutional: No fever, yes weight loss, no fatigue, low energy, generalized weakness, poor appetite  Cardiovascular/ Respiratory: No palpitations,, no chest pain, no shortness of breath, no exercise intolerance, no cough, no leg/ ankle swelling  Gastrointestinal: Yes abdominal pain, no bloating, no nausea, no vomiting, no constipation, no diarrhea  Neurological: No dizziness/ lightheadedness, no tremors, no numbness/ tingling in feet, no pain/ burning in feet  Endocrine: No Frequent urination, excessive urination, excessive thirst, symptoms     Past medical and surgical hx:  Type 2 Diabetes  HLD (hyperlipidemia)  HTN (hypertension)  Stage 3 chronic kidney disease  Cervical radiculopathy  Gall stones  Osteopenia  Hepatic steatosis  2019 novel coronavirus disease (COVID-19)  History of colonoscopy    Family History:   Type 2 diabetes. PVD and heart disease (Mother)  FH: stroke (Father, Sibling)    Social History:  Tobacco: Denies  Alcohol: Denies  illicit drug abuse: Denies    Medications  (Standing):  cefTRIAXone   IVPB 1000 milliGRAM(s) IV Intermittent every 24 hours  dextrose 5%. 1000 milliLiter(s) (50 mL/Hr) IV Continuous <Continuous>  dextrose 5%. 1000 milliLiter(s) (100 mL/Hr) IV Continuous <Continuous>  dextrose 50% Injectable 25 Gram(s) IV Push once  dextrose 50% Injectable 12.5 Gram(s) IV Push once  dextrose 50% Injectable 25 Gram(s) IV Push once  glucagon  Injectable 1 milliGRAM(s) IntraMuscular once  influenza  Vaccine (HIGH DOSE) 0.5 milliLiter(s) IntraMuscular once  insulin glargine Injectable (LANTUS) 5 Unit(s) SubCutaneous at bedtime  insulin lispro (ADMELOG) corrective regimen sliding scale   SubCutaneous three times a day before meals  insulin lispro (ADMELOG) corrective regimen sliding scale   SubCutaneous at bedtime  lactated ringers. 1000 milliLiter(s) (105 mL/Hr) IV Continuous <Continuous>  lactated ringers. 1000 milliLiter(s) (100 mL/Hr) IV Continuous <Continuous>  metoprolol succinate ER 50 milliGRAM(s) Oral daily  metroNIDAZOLE  IVPB      metroNIDAZOLE  IVPB 500 milliGRAM(s) IV Intermittent every 8 hours  naloxone Injectable 0.4 milliGRAM(s) IV Push once  polyethylene glycol 3350 17 Gram(s) Oral daily  senna 2 Tablet(s) Oral at bedtime    Medications  (PRN):  bisacodyl 5 milliGRAM(s) Oral daily PRN Constipation  dextrose Oral Gel 15 Gram(s) Oral once PRN Blood Glucose LESS THAN 70 milliGRAM(s)/deciliter  HYDROmorphone  Injectable 0.5 milliGRAM(s) IV Push every 4 hours PRN Severe Pain (7 - 10)  ketorolac   Injectable 15 milliGRAM(s) IV Push every 6 hours PRN Moderate Pain (4 - 6)  ondansetron Injectable 4 milliGRAM(s) IV Push every 8 hours PRN Nausea and/or Vomiting    Physical Examination  Vital Signs Last 24 Hrs  T(C): 36.3 (2024 20:49), Max: 36.6 (2024 05:04)  T(F): 97.3 (2024 20:49), Max: 97.9 (2024 05:04)  HR: 66 (2024 20:49) (66 - 72)  BP: 159/66 (2024 20:49) (143/73 - 159/66)  BP(mean): --  RR: 18 (2024 20:49) (17 - 18)  SpO2: 96% (2024 20:49) (96% - 97%)    Constitutional: No acute distress, ill- appearing  HEENT: Moist mucous membranes  Cardiovascular:  Regular heart rate, normal S1 and S2 sounds, without murmur, rub or gallop.  Gastrointestinal: Soft, non tender without hepatosplenomegaly and masses, no abdominal obesity  Extremities: Sensation intact in feet, no cyanosis, clubbing or edema, positive pedal pulses  Neurological:  Oriented to person, place and time    Labs:                   12.8   6.67  )-----------( 224      ( 2024 08:40 )             38.4   11-  142  |  107  |  11  ----------------------------<  256[H]  3.9   |  27  |  1.10  Ca    9.8      2024 08:40  Phos  3.3     -  Mg     1.7         Capillary blood glucose:  POCT Blood Glucose.: 202 mg/dL (2024 21:19)  POCT Blood Glucose.: 223 mg/dL (2024 17:02)  POCT Blood Glucose.: 285 mg/dL (2024 11:36)  POCT Blood Glucose.: 220 mg/dL (2024 07:51)  POCT Blood Glucose.: 249 mg/dL (2024 06:01)  POCT Blood Glucose.: 231 mg/dL (2024 23:21)  A1C with Estimated Average Glucose Result: 9.8 % (11.18.24 @ 05:27)    Assessment and Plan:  69y F with PMH of HTN, HLD, DM, CKD3, Cervical radiculopathy, Gall stones, Hepatic steatosis, and Osteopenia presenting with abdominal pain. Admitted for gallbladder pancreatitis.  Endocrinology is consulted for glycemic management    1) Poorly controlled Type 2 diabetes:  2) Pancreatitis    A1C above goal despite of three antidiabetes medications likely due to dietary indiscretion  In hospital, patient is not eating full liquid diet  Will be NPO tomorrow for cholecystectomy  Adjust the insulin as below    Inpatient Recommendations:  Basal Insulin:   Start Glargine ( Lantus) 5 units tonight, from tomorrow night increase lantus to 12 units at bedtime    Nutritional Insulin:  Hold for now     Correctional Insulin:  Change to moderate Lispro ( Admelog) correctional scale with meals and bedtime    Oral Diabetes Medications:  None in the hospital      Extensive education to patient and patient's daughter about diabetes, hyperglycemia, hypoglycemia, glucose self-monitoring with glucometer, glucose target ranges, adherence to diabetes medications, diet, physical activity, importance of following up with outpatient endocrinology/ opthalmology and podiatry appointments discussed.   Discussed the six inch plate method rule  Explained the definition of HBA1C and target range.   Explained the complications of diabetes including stroke, renal failure and blindness  Reviewed hypoglycemic sign/symptoms and necessary precautions.   Discussed the goal fasting and postprandial BS at home  Patient verbalized understanding and agrees with the plan

## 2024-11-19 NOTE — CONSULT NOTE ADULT - NS ATTEST RISK PROBLEM GEN_ALL_CORE FT
Patient is high risk with high level decision making due to uncontrolled diabetes which places patient at high risk for cardiovascular and cerebrovascular events. Patient with lability of glucose requiring close monitoring and insulin adjustments.

## 2024-11-20 LAB
ALBUMIN SERPL ELPH-MCNC: 3.8 G/DL — SIGNIFICANT CHANGE UP (ref 3.5–5)
ALP SERPL-CCNC: 452 U/L — HIGH (ref 40–120)
ALT FLD-CCNC: 425 U/L DA — HIGH (ref 10–60)
ANION GAP SERPL CALC-SCNC: 6 MMOL/L — SIGNIFICANT CHANGE UP (ref 5–17)
AST SERPL-CCNC: 86 U/L — HIGH (ref 10–40)
BILIRUB SERPL-MCNC: 0.7 MG/DL — SIGNIFICANT CHANGE UP (ref 0.2–1.2)
BUN SERPL-MCNC: 9 MG/DL — SIGNIFICANT CHANGE UP (ref 7–18)
CALCIUM SERPL-MCNC: 9.3 MG/DL — SIGNIFICANT CHANGE UP (ref 8.4–10.5)
CHLORIDE SERPL-SCNC: 107 MMOL/L — SIGNIFICANT CHANGE UP (ref 96–108)
CO2 SERPL-SCNC: 25 MMOL/L — SIGNIFICANT CHANGE UP (ref 22–31)
CREAT SERPL-MCNC: 0.9 MG/DL — SIGNIFICANT CHANGE UP (ref 0.5–1.3)
CRP SERPL-MCNC: 8.9 MG/L — HIGH (ref 0–5)
EGFR: 70 ML/MIN/1.73M2 — SIGNIFICANT CHANGE UP
GLUCOSE BLDC GLUCOMTR-MCNC: 161 MG/DL — HIGH (ref 70–99)
GLUCOSE BLDC GLUCOMTR-MCNC: 204 MG/DL — HIGH (ref 70–99)
GLUCOSE BLDC GLUCOMTR-MCNC: 241 MG/DL — HIGH (ref 70–99)
GLUCOSE BLDC GLUCOMTR-MCNC: 308 MG/DL — HIGH (ref 70–99)
GLUCOSE BLDC GLUCOMTR-MCNC: 319 MG/DL — HIGH (ref 70–99)
GLUCOSE SERPL-MCNC: 248 MG/DL — HIGH (ref 70–99)
HCT VFR BLD CALC: 38.4 % — SIGNIFICANT CHANGE UP (ref 34.5–45)
HGB BLD-MCNC: 12.8 G/DL — SIGNIFICANT CHANGE UP (ref 11.5–15.5)
INR BLD: 0.95 RATIO — SIGNIFICANT CHANGE UP (ref 0.85–1.16)
MAGNESIUM SERPL-MCNC: 1.6 MG/DL — SIGNIFICANT CHANGE UP (ref 1.6–2.6)
MCHC RBC-ENTMCNC: 29.8 PG — SIGNIFICANT CHANGE UP (ref 27–34)
MCHC RBC-ENTMCNC: 33.3 G/DL — SIGNIFICANT CHANGE UP (ref 32–36)
MCV RBC AUTO: 89.5 FL — SIGNIFICANT CHANGE UP (ref 80–100)
NRBC # BLD: 0 /100 WBCS — SIGNIFICANT CHANGE UP (ref 0–0)
PHOSPHATE SERPL-MCNC: 3 MG/DL — SIGNIFICANT CHANGE UP (ref 2.5–4.5)
PLATELET # BLD AUTO: 242 K/UL — SIGNIFICANT CHANGE UP (ref 150–400)
POTASSIUM SERPL-MCNC: 4 MMOL/L — SIGNIFICANT CHANGE UP (ref 3.5–5.3)
POTASSIUM SERPL-SCNC: 4 MMOL/L — SIGNIFICANT CHANGE UP (ref 3.5–5.3)
PROT SERPL-MCNC: 7.6 G/DL — SIGNIFICANT CHANGE UP (ref 6–8.3)
PROTHROM AB SERPL-ACNC: 11 SEC — SIGNIFICANT CHANGE UP (ref 9.9–13.4)
RBC # BLD: 4.29 M/UL — SIGNIFICANT CHANGE UP (ref 3.8–5.2)
RBC # FLD: 12.7 % — SIGNIFICANT CHANGE UP (ref 10.3–14.5)
SODIUM SERPL-SCNC: 138 MMOL/L — SIGNIFICANT CHANGE UP (ref 135–145)
WBC # BLD: 7.25 K/UL — SIGNIFICANT CHANGE UP (ref 3.8–10.5)
WBC # FLD AUTO: 7.25 K/UL — SIGNIFICANT CHANGE UP (ref 3.8–10.5)

## 2024-11-20 PROCEDURE — 47562 LAPAROSCOPIC CHOLECYSTECTOMY: CPT

## 2024-11-20 PROCEDURE — 88304 TISSUE EXAM BY PATHOLOGIST: CPT | Mod: 26

## 2024-11-20 PROCEDURE — S2900 ROBOTIC SURGICAL SYSTEM: CPT | Mod: NC

## 2024-11-20 PROCEDURE — 99232 SBSQ HOSP IP/OBS MODERATE 35: CPT | Mod: GC

## 2024-11-20 PROCEDURE — 47562 LAPAROSCOPIC CHOLECYSTECTOMY: CPT | Mod: AS

## 2024-11-20 RX ORDER — ONDANSETRON HYDROCHLORIDE 4 MG/1
4 TABLET, FILM COATED ORAL ONCE
Refills: 0 | Status: DISCONTINUED | OUTPATIENT
Start: 2024-11-20 | End: 2024-11-20

## 2024-11-20 RX ORDER — METRONIDAZOLE 500 MG/1
500 TABLET ORAL ONCE
Refills: 0 | Status: COMPLETED | OUTPATIENT
Start: 2024-11-20 | End: 2024-11-20

## 2024-11-20 RX ORDER — 0.9 % SODIUM CHLORIDE 0.9 %
1000 INTRAVENOUS SOLUTION INTRAVENOUS
Refills: 0 | Status: DISCONTINUED | OUTPATIENT
Start: 2024-11-20 | End: 2024-11-20

## 2024-11-20 RX ORDER — POLYETHYLENE GLYCOL 3350 17 G/17G
17 POWDER, FOR SOLUTION ORAL DAILY
Refills: 0 | Status: DISCONTINUED | OUTPATIENT
Start: 2024-11-20 | End: 2024-11-21

## 2024-11-20 RX ORDER — METRONIDAZOLE 500 MG/1
TABLET ORAL
Refills: 0 | Status: DISCONTINUED | OUTPATIENT
Start: 2024-11-20 | End: 2024-11-21

## 2024-11-20 RX ORDER — FENTANYL 12 UG/H
50 PATCH, EXTENDED RELEASE TRANSDERMAL
Refills: 0 | Status: DISCONTINUED | OUTPATIENT
Start: 2024-11-20 | End: 2024-11-20

## 2024-11-20 RX ORDER — AMLODIPINE BESYLATE 10 MG/1
5 TABLET ORAL DAILY
Refills: 0 | Status: DISCONTINUED | OUTPATIENT
Start: 2024-11-20 | End: 2024-11-21

## 2024-11-20 RX ORDER — CEFTRIAXONE SODIUM 1 G
2000 VIAL (EA) INJECTION ONCE
Refills: 0 | Status: COMPLETED | OUTPATIENT
Start: 2024-11-20 | End: 2024-11-20

## 2024-11-20 RX ORDER — INSULIN GLARGINE 100 [IU]/ML
12 INJECTION, SOLUTION SUBCUTANEOUS AT BEDTIME
Refills: 0 | Status: DISCONTINUED | OUTPATIENT
Start: 2024-11-20 | End: 2024-11-21

## 2024-11-20 RX ORDER — HYDROMORPHONE HYDROCHLORIDE 2 MG/1
0.5 TABLET ORAL EVERY 4 HOURS
Refills: 0 | Status: DISCONTINUED | OUTPATIENT
Start: 2024-11-20 | End: 2024-11-21

## 2024-11-20 RX ORDER — FENTANYL 12 UG/H
25 PATCH, EXTENDED RELEASE TRANSDERMAL
Refills: 0 | Status: DISCONTINUED | OUTPATIENT
Start: 2024-11-20 | End: 2024-11-20

## 2024-11-20 RX ORDER — METRONIDAZOLE 500 MG/1
500 TABLET ORAL EVERY 8 HOURS
Refills: 0 | Status: DISCONTINUED | OUTPATIENT
Start: 2024-11-20 | End: 2024-11-21

## 2024-11-20 RX ORDER — KETOROLAC TROMETHAMINE 30 MG/ML
15 INJECTION INTRAMUSCULAR; INTRAVENOUS EVERY 6 HOURS
Refills: 0 | Status: DISCONTINUED | OUTPATIENT
Start: 2024-11-20 | End: 2024-11-21

## 2024-11-20 RX ORDER — SENNOSIDES 8.6 MG
2 TABLET ORAL AT BEDTIME
Refills: 0 | Status: DISCONTINUED | OUTPATIENT
Start: 2024-11-20 | End: 2024-11-21

## 2024-11-20 RX ORDER — METOPROLOL TARTRATE 100 MG/1
50 TABLET, FILM COATED ORAL DAILY
Refills: 0 | Status: DISCONTINUED | OUTPATIENT
Start: 2024-11-20 | End: 2024-11-21

## 2024-11-20 RX ADMIN — Medication 2 TABLET(S): at 21:39

## 2024-11-20 RX ADMIN — METRONIDAZOLE 100 MILLIGRAM(S): 500 TABLET ORAL at 21:41

## 2024-11-20 RX ADMIN — Medication 4 UNIT(S): at 12:27

## 2024-11-20 RX ADMIN — Medication 100 MILLILITER(S): at 09:03

## 2024-11-20 RX ADMIN — KETOROLAC TROMETHAMINE 15 MILLIGRAM(S): 30 INJECTION INTRAMUSCULAR; INTRAVENOUS at 19:53

## 2024-11-20 RX ADMIN — Medication 8: at 17:34

## 2024-11-20 RX ADMIN — INSULIN GLARGINE 12 UNIT(S): 100 INJECTION, SOLUTION SUBCUTANEOUS at 21:39

## 2024-11-20 RX ADMIN — METOPROLOL TARTRATE 50 MILLIGRAM(S): 100 TABLET, FILM COATED ORAL at 05:20

## 2024-11-20 RX ADMIN — Medication 4: at 21:41

## 2024-11-20 RX ADMIN — Medication 4: at 06:22

## 2024-11-20 RX ADMIN — METRONIDAZOLE 100 MILLIGRAM(S): 500 TABLET ORAL at 18:37

## 2024-11-20 RX ADMIN — METRONIDAZOLE 100 MILLIGRAM(S): 500 TABLET ORAL at 05:21

## 2024-11-20 RX ADMIN — Medication 100 MILLIGRAM(S): at 18:09

## 2024-11-20 RX ADMIN — Medication 100 MILLILITER(S): at 05:21

## 2024-11-20 RX ADMIN — KETOROLAC TROMETHAMINE 15 MILLIGRAM(S): 30 INJECTION INTRAMUSCULAR; INTRAVENOUS at 20:50

## 2024-11-20 NOTE — PROGRESS NOTE ADULT - PROBLEM SELECTOR PLAN 5
h/o HTN on european medication triplixam (Perindopril, Indapamide, and Amlodipine) and metoprolol ER at home  -c/w home amlodipine and metoprolol with parameters  -monitor BP  -adjust antihypertensive meds as appropriate

## 2024-11-20 NOTE — PROGRESS NOTE ADULT - SUBJECTIVE AND OBJECTIVE BOX
General Surgery    Subjective:  Pt resting comfortably. Seen with family at bedside who provided translation. Offered  bNo acute complaints.  Tolerating pain with meds.  Tolerating diet  Denies N/V. Has not yet voided.      T(C): 36.4 (11-20-24 @ 14:49), Max: 36.9 (11-20-24 @ 11:46)  HR: 68 (11-20-24 @ 14:49) (62 - 90)  BP: 142/68 (11-20-24 @ 14:49) (111/66 - 179/71)  RR: 18 (11-20-24 @ 14:49) (15 - 21)  SpO2: 94% (11-20-24 @ 14:49) (94% - 99%)    Physical:  Gen: A&O x3  Abd: Soft ND, incisions well approximated with skin glue

## 2024-11-20 NOTE — PROGRESS NOTE ADULT - PROBLEM SELECTOR PLAN 6
hx of HLD on atorvastatin 80mg qhs  -hold home statin in s/o transaminitis  - Lipid panel noted  -DASH diet when no longer NPO

## 2024-11-20 NOTE — PROGRESS NOTE ADULT - PROBLEM SELECTOR PROBLEM 3
Insulin dependent type 2 diabetes mellitus

## 2024-11-20 NOTE — PROGRESS NOTE ADULT - ASSESSMENT
69y.o. Female POD#0 s/p RA cholecystectomy     - Diet as tolerated   - IVF  - Pain control prn  - DVT ppx  - Incentive spirometry  - OOB / ambulate   - Remainder of care per primary team

## 2024-11-20 NOTE — PROGRESS NOTE ADULT - PROBLEM SELECTOR PLAN 4
hx of CKD3   p/w Creatinine: 0.99-1.07; eGFR 57-62  baseline creatinine 1.19; baseline eGFR 47  -CT A/P shows Indeterminate left renal hypodense lesion measuring 2.8 x 2.3 cm  -f/u renal US to exclude neoplasm      -MRI:  2.7 cm hemorrhagic or proteinaceous cyst in the left kidney. Outpatient f/u  -avoid nephrotoxic agents (judicious use of toradol for moderate pain)  -trend BMP daily

## 2024-11-20 NOTE — PROGRESS NOTE ADULT - PROBLEM SELECTOR PLAN 3
h/o DM on insulin-GLP1 combination, metformin (+metformax), farxiga at home  - hold oral dm meds  -f/u A1c- 9.8  -On glargine/lixesenatide 45U qAM at home  -Endocrine consulted        -12 units of lantus at bedtime       -Moderate Lispro sliding scale with means and at bedtime

## 2024-11-20 NOTE — PROGRESS NOTE ADULT - SUBJECTIVE AND OBJECTIVE BOX
PGY-1 Progress Note discussed with attending    PAGER #: Author on Teams TILL 5:00 PM  PLEASE CONTACT ON CALL TEAM:  - On Call Team (Please refer to [place]) FROM 5:00 PM - 8:30PM  - Nightfloat Team FROM 8:30 -7:30 AM      INTERVAL HPI/OVERNIGHT EVENTS: No acute overnight events. Patient is s/p cholecystectomy today.      REVIEW OF SYSTEMS:  CONSTITUTIONAL: No fever, weight loss, or fatigue  RESPIRATORY: No cough, wheezing, chills or hemoptysis; No shortness of breath  CARDIOVASCULAR: No chest pain, palpitations, dizziness, or leg swelling  GASTROINTESTINAL: No abdominal pain. No nausea, vomiting, or hematemesis; No diarrhea or constipation. No melena or hematochezia.  GENITOURINARY: No dysuria or hematuria, urinary frequency  NEUROLOGICAL: No headaches, memory loss, loss of strength, numbness, or tremors  SKIN: No itching, burning, rashes, or lesions     Vital Signs Last 24 Hrs  T(C): 36.9 (20 Nov 2024 11:46), Max: 36.9 (20 Nov 2024 11:46)  T(F): 98.4 (20 Nov 2024 11:46), Max: 98.4 (20 Nov 2024 11:46)  HR: 64 (20 Nov 2024 12:46) (62 - 90)  BP: 130/54 (20 Nov 2024 12:46) (111/66 - 179/71)  BP(mean): 76 (20 Nov 2024 12:46) (68 - 86)  RR: 18 (20 Nov 2024 12:46) (15 - 21)  SpO2: 94% (20 Nov 2024 12:46) (94% - 99%)    Parameters below as of 20 Nov 2024 12:16  Patient On (Oxygen Delivery Method): room air        PHYSICAL EXAMINATION:  GENERAL: NAD  HEAD:  Atraumatic, Normocephalic  EYES:  conjunctiva and sclera clear  NECK: Supple, No JVD, Normal thyroid  CHEST/LUNG: Clear to auscultation. Clear to percussion bilaterally; No rales, rhonchi, wheezing, or rubs  HEART: Regular rate and rhythm; No murmurs, rubs, or gallops  ABDOMEN: Soft, Nontender, Nondistended; Bowel sounds present  NERVOUS SYSTEM:  Alert & Oriented X3,    EXTREMITIES:  2+ Peripheral Pulses, No clubbing, cyanosis, or edema  SKIN: warm dry                          12.8   7.25  )-----------( 242      ( 20 Nov 2024 05:00 )             38.4     11-20    138  |  107  |  9   ----------------------------<  248[H]  4.0   |  25  |  0.90    Ca    9.3      20 Nov 2024 05:00  Phos  3.0     11-20  Mg     1.6     11-20    TPro  7.6  /  Alb  3.8  /  TBili  0.7  /  DBili  x   /  AST  86[H]  /  ALT  425[H]  /  AlkPhos  452[H]  11-20    LIVER FUNCTIONS - ( 20 Nov 2024 05:00 )  Alb: 3.8 g/dL / Pro: 7.6 g/dL / ALK PHOS: 452 U/L / ALT: 425 U/L DA / AST: 86 U/L / GGT: x               PT/INR - ( 20 Nov 2024 05:00 )   PT: 11.0 sec;   INR: 0.95 ratio         PTT - ( 19 Nov 2024 08:40 )  PTT:32.1 sec    CAPILLARY BLOOD GLUCOSE      RADIOLOGY & ADDITIONAL TESTS:

## 2024-11-20 NOTE — PROGRESS NOTE ADULT - ATTENDING COMMENTS
A/p# Acute Gall stone Pancreatitis # Uncontrolled Type 2 DM # HTN    -Daughter Zenaida at bedside interpreted polish.,   s/p Cholecystectomy today, feels well, no complaints  -d/w daughter and patient regarding renal cyst and follow up as out-patient.     o/e nad comfortable, cta bl, s1s2+rrr, soft bs+ntnd, ao3 non focal    -Endo consult for uncontrolled DM, [id number]- consult appreciated- d/w Dr. Durant- insulin adjusted accordingly  - cbc, bmp , lfts, mg phos reviewed. check cbc, bmp mg phos lft sin the morning  -c./w iv fluids  -c.w iv ATBx  care plan d/w daughter at bedside.   d/c planning for tomorrow.

## 2024-11-20 NOTE — PROGRESS NOTE ADULT - PROBLEM SELECTOR PLAN 2
hx of hepatic steatosis and gall stones presenting with abdominal pain  -AST:1240, ALT:747, ALP:295, Tbili:1.3  --CT A/P shows distended gallbladder containing multiple gallstones largest measuring without significant wall thickening or hyperemia.  -f/u RUQ US for further evaluation of GB  -mostly hepatocellular injury despite radiographic evidence of multiple gall stones  -suspect component of DILI  possibly 2/2 metformin   -f/u acute hepatitis panel, BOB, AMA, LKB, SMA  -f/u serum acetemenophen levels  -avoid tylenol, hold home statin  -f/u PT/INR, calculate MELD  -trend lactate to normal  -trend LFTs  -GI consulted
hx of hepatic steatosis and gall stones presenting with abdominal pain  -AST:1240, ALT:747, ALP:295, Tbili:1.3  --CT A/P shows distended gallbladder containing multiple gallstones largest measuring without significant wall thickening or hyperemia.  -f/u RUQ US for further evaluation of GB  -mostly hepatocellular injury despite radiographic evidence of multiple gall stones  -suspect component of DILI  possibly 2/2 metformin   -f/u acute hepatitis panel, BOB, AMA, LKB, SMA  -f/u serum acetemenophen levels  -avoid tylenol, hold home statin  -f/u PT/INR, calculate MELD  -trend lactate to normal  -trend LFTs  -GI consulted
hx of hepatic steatosis and gall stones presenting with abdominal pain  -AST:1240, ALT:747, ALP:295, Tbili:1.3  --CT A/P shows distended gallbladder containing multiple gallstones largest measuring without significant wall thickening or hyperemia.  -f/u RUQ US for further evaluation of GB  -mostly hepatocellular injury despite radiographic evidence of multiple gall stones  -suspect component of DILI  possibly 2/2 metformin   -f/u acute hepatitis panel, BOB, AMA, LKB, SMA  -f/u serum acetemenophen levels- <2  -avoid tylenol, hold home statin  -f/u PT/INR, calculate MELD  -trend lactate to normal- normalized  -trend LFTs- downtrending  -GI consulted

## 2024-11-20 NOTE — PROGRESS NOTE ADULT - PROBLEM SELECTOR PLAN 1
hx of gall stones p/w acute post prandial abdominal pain  -Lipase 2342, Lactate 4.5-->3.5   -CT A/P shows distended gallbladder containing multiple gallstones largest measuring without significant wall thickening or hyperemia. Pancreas within normal limits.   -c/w moderate IV fluid hydration protocol (1.5cc/kg) - LR 105cc/hr  -Given mostly hepatocellular component of liver injury in s/o high doses of metformin and GLP-1 agonist pancreatitis may also be related to medications  -f/u serum triglycerides  -trend lactate to normal  - Clear liquid diet for now, NPO after midnight for planned surgery 11/20  - zofran 4mg ivp q8 prn n/v  -pain management - toradol 15mg IV q6 (moderate), dilaudid 0.5mg IV q4 (severe)  - F/U MRCP- no acute pancreatitis or necrosis. no evidence of choledocholithiasis  - RCRI: 1 points Class II Risk, 6% 30-day risk of death, MI, or cardiac arrest  - HORNER: 0.0 % Risk of myocardial infarction or cardiac arrest, intraoperatively or up to 30 days post-op  - METS > 4  - Patient is low to intermediate risk for Robotic laparoscopic cholecystectomy  -Surgery consulted, planned surgery 11/20  -GI consulted- no ERCP needed pre-OR since CBD appears normal hx of gall stones p/w acute post prandial abdominal pain  -Lipase 2342, Lactate 4.5-->3.5   -CT A/P shows distended gallbladder containing multiple gallstones largest measuring without significant wall thickening or hyperemia. Pancreas within normal limits.   -c/w moderate IV fluid hydration protocol (1.5cc/kg) - LR 105cc/hr  -Given mostly hepatocellular component of liver injury in s/o high doses of metformin and GLP-1 agonist pancreatitis may also be related to medications  -f/u serum triglycerides  -trend lactate to normal  - Clear liquid diet for now, NPO after midnight for planned surgery 11/20  - zofran 4mg ivp q8 prn n/v  -pain management - toradol 15mg IV q6 (moderate), dilaudid 0.5mg IV q4 (severe)  - F/U MRCP- no acute pancreatitis or necrosis. no evidence of choledocholithiasis  - RCRI: 1 points Class II Risk, 6% 30-day risk of death, MI, or cardiac arrest  - HORNER: 0.0 % Risk of myocardial infarction or cardiac arrest, intraoperatively or up to 30 days post-op  - METS > 4  - Patient is low to intermediate risk for Robotic laparoscopic cholecystectomy  -Surgery consulted,       -s/p cholecystectomy today  -GI consulted- no ERCP needed pre-OR since CBD appears normal

## 2024-11-21 ENCOUNTER — TRANSCRIPTION ENCOUNTER (OUTPATIENT)
Age: 69
End: 2024-11-21

## 2024-11-21 VITALS
RESPIRATION RATE: 18 BRPM | TEMPERATURE: 98 F | DIASTOLIC BLOOD PRESSURE: 68 MMHG | OXYGEN SATURATION: 94 % | HEART RATE: 75 BPM | SYSTOLIC BLOOD PRESSURE: 134 MMHG

## 2024-11-21 DIAGNOSIS — K85.90 ACUTE PANCREATITIS WITHOUT NECROSIS OR INFECTION, UNSPECIFIED: ICD-10-CM

## 2024-11-21 LAB
ALBUMIN SERPL ELPH-MCNC: 3.1 G/DL — LOW (ref 3.5–5)
ALP SERPL-CCNC: 318 U/L — HIGH (ref 40–120)
ALT FLD-CCNC: 270 U/L DA — HIGH (ref 10–60)
ANION GAP SERPL CALC-SCNC: 8 MMOL/L — SIGNIFICANT CHANGE UP (ref 5–17)
AST SERPL-CCNC: 58 U/L — HIGH (ref 10–40)
BILIRUB SERPL-MCNC: 0.5 MG/DL — SIGNIFICANT CHANGE UP (ref 0.2–1.2)
BUN SERPL-MCNC: 16 MG/DL — SIGNIFICANT CHANGE UP (ref 7–18)
CALCIUM SERPL-MCNC: 9.1 MG/DL — SIGNIFICANT CHANGE UP (ref 8.4–10.5)
CHLORIDE SERPL-SCNC: 109 MMOL/L — HIGH (ref 96–108)
CO2 SERPL-SCNC: 22 MMOL/L — SIGNIFICANT CHANGE UP (ref 22–31)
CREAT SERPL-MCNC: 1.15 MG/DL — SIGNIFICANT CHANGE UP (ref 0.5–1.3)
EGFR: 52 ML/MIN/1.73M2 — LOW
GLUCOSE BLDC GLUCOMTR-MCNC: 212 MG/DL — HIGH (ref 70–99)
GLUCOSE BLDC GLUCOMTR-MCNC: 216 MG/DL — HIGH (ref 70–99)
GLUCOSE BLDC GLUCOMTR-MCNC: 253 MG/DL — HIGH (ref 70–99)
GLUCOSE SERPL-MCNC: 261 MG/DL — HIGH (ref 70–99)
HCT VFR BLD CALC: 32.7 % — LOW (ref 34.5–45)
HGB BLD-MCNC: 10.9 G/DL — LOW (ref 11.5–15.5)
MAGNESIUM SERPL-MCNC: 1.8 MG/DL — SIGNIFICANT CHANGE UP (ref 1.6–2.6)
MCHC RBC-ENTMCNC: 30.4 PG — SIGNIFICANT CHANGE UP (ref 27–34)
MCHC RBC-ENTMCNC: 33.3 G/DL — SIGNIFICANT CHANGE UP (ref 32–36)
MCV RBC AUTO: 91.1 FL — SIGNIFICANT CHANGE UP (ref 80–100)
NRBC # BLD: 0 /100 WBCS — SIGNIFICANT CHANGE UP (ref 0–0)
PHOSPHATE SERPL-MCNC: 3 MG/DL — SIGNIFICANT CHANGE UP (ref 2.5–4.5)
PLATELET # BLD AUTO: 226 K/UL — SIGNIFICANT CHANGE UP (ref 150–400)
POTASSIUM SERPL-MCNC: 4.3 MMOL/L — SIGNIFICANT CHANGE UP (ref 3.5–5.3)
POTASSIUM SERPL-SCNC: 4.3 MMOL/L — SIGNIFICANT CHANGE UP (ref 3.5–5.3)
PROT SERPL-MCNC: 6.4 G/DL — SIGNIFICANT CHANGE UP (ref 6–8.3)
RBC # BLD: 3.59 M/UL — LOW (ref 3.8–5.2)
RBC # FLD: 12.9 % — SIGNIFICANT CHANGE UP (ref 10.3–14.5)
SODIUM SERPL-SCNC: 139 MMOL/L — SIGNIFICANT CHANGE UP (ref 135–145)
WBC # BLD: 8.62 K/UL — SIGNIFICANT CHANGE UP (ref 3.8–10.5)
WBC # FLD AUTO: 8.62 K/UL — SIGNIFICANT CHANGE UP (ref 3.8–10.5)

## 2024-11-21 PROCEDURE — 96374 THER/PROPH/DIAG INJ IV PUSH: CPT

## 2024-11-21 PROCEDURE — 88304 TISSUE EXAM BY PATHOLOGIST: CPT

## 2024-11-21 PROCEDURE — 86381 MITOCHONDRIAL ANTIBODY EACH: CPT

## 2024-11-21 PROCEDURE — 93005 ELECTROCARDIOGRAM TRACING: CPT

## 2024-11-21 PROCEDURE — 82962 GLUCOSE BLOOD TEST: CPT

## 2024-11-21 PROCEDURE — 86850 RBC ANTIBODY SCREEN: CPT

## 2024-11-21 PROCEDURE — 80074 ACUTE HEPATITIS PANEL: CPT

## 2024-11-21 PROCEDURE — 83605 ASSAY OF LACTIC ACID: CPT

## 2024-11-21 PROCEDURE — 74183 MRI ABD W/O CNTR FLWD CNTR: CPT | Mod: MC

## 2024-11-21 PROCEDURE — 82247 BILIRUBIN TOTAL: CPT

## 2024-11-21 PROCEDURE — S2900: CPT

## 2024-11-21 PROCEDURE — 74177 CT ABD & PELVIS W/CONTRAST: CPT | Mod: MC

## 2024-11-21 PROCEDURE — 36415 COLL VENOUS BLD VENIPUNCTURE: CPT

## 2024-11-21 PROCEDURE — 85025 COMPLETE CBC W/AUTO DIFF WBC: CPT

## 2024-11-21 PROCEDURE — 83735 ASSAY OF MAGNESIUM: CPT

## 2024-11-21 PROCEDURE — 86376 MICROSOMAL ANTIBODY EACH: CPT

## 2024-11-21 PROCEDURE — 83036 HEMOGLOBIN GLYCOSYLATED A1C: CPT

## 2024-11-21 PROCEDURE — 76700 US EXAM ABDOM COMPLETE: CPT

## 2024-11-21 PROCEDURE — 80048 BASIC METABOLIC PNL TOTAL CA: CPT

## 2024-11-21 PROCEDURE — 99285 EMERGENCY DEPT VISIT HI MDM: CPT

## 2024-11-21 PROCEDURE — 80053 COMPREHEN METABOLIC PANEL: CPT

## 2024-11-21 PROCEDURE — 82803 BLOOD GASES ANY COMBINATION: CPT

## 2024-11-21 PROCEDURE — 85610 PROTHROMBIN TIME: CPT

## 2024-11-21 PROCEDURE — 86923 COMPATIBILITY TEST ELECTRIC: CPT

## 2024-11-21 PROCEDURE — 99239 HOSP IP/OBS DSCHRG MGMT >30: CPT | Mod: GC

## 2024-11-21 PROCEDURE — 84100 ASSAY OF PHOSPHORUS: CPT

## 2024-11-21 PROCEDURE — 99232 SBSQ HOSP IP/OBS MODERATE 35: CPT

## 2024-11-21 PROCEDURE — 86901 BLOOD TYPING SEROLOGIC RH(D): CPT

## 2024-11-21 PROCEDURE — 86900 BLOOD TYPING SEROLOGIC ABO: CPT

## 2024-11-21 PROCEDURE — 86038 ANTINUCLEAR ANTIBODIES: CPT

## 2024-11-21 PROCEDURE — 96375 TX/PRO/DX INJ NEW DRUG ADDON: CPT

## 2024-11-21 PROCEDURE — 86140 C-REACTIVE PROTEIN: CPT

## 2024-11-21 PROCEDURE — 80061 LIPID PANEL: CPT

## 2024-11-21 PROCEDURE — 80076 HEPATIC FUNCTION PANEL: CPT

## 2024-11-21 PROCEDURE — A9585: CPT

## 2024-11-21 PROCEDURE — 85730 THROMBOPLASTIN TIME PARTIAL: CPT

## 2024-11-21 PROCEDURE — 87040 BLOOD CULTURE FOR BACTERIA: CPT

## 2024-11-21 PROCEDURE — 84484 ASSAY OF TROPONIN QUANT: CPT

## 2024-11-21 PROCEDURE — 85027 COMPLETE CBC AUTOMATED: CPT

## 2024-11-21 PROCEDURE — 80307 DRUG TEST PRSMV CHEM ANLYZR: CPT

## 2024-11-21 PROCEDURE — 83690 ASSAY OF LIPASE: CPT

## 2024-11-21 PROCEDURE — 86255 FLUORESCENT ANTIBODY SCREEN: CPT

## 2024-11-21 PROCEDURE — 82105 ALPHA-FETOPROTEIN SERUM: CPT

## 2024-11-21 RX ORDER — DAPAGLIFLOZIN 10 MG/1
1 TABLET, FILM COATED ORAL
Refills: 2 | DISCHARGE

## 2024-11-21 RX ORDER — INSULIN GLARGINE 100 [IU]/ML
15 INJECTION, SOLUTION SUBCUTANEOUS
Qty: 1 | Refills: 0
Start: 2024-11-21 | End: 2024-12-20

## 2024-11-21 RX ADMIN — METOPROLOL TARTRATE 50 MILLIGRAM(S): 100 TABLET, FILM COATED ORAL at 05:52

## 2024-11-21 RX ADMIN — Medication 6: at 08:22

## 2024-11-21 RX ADMIN — METRONIDAZOLE 100 MILLIGRAM(S): 500 TABLET ORAL at 05:52

## 2024-11-21 RX ADMIN — METRONIDAZOLE 100 MILLIGRAM(S): 500 TABLET ORAL at 13:58

## 2024-11-21 RX ADMIN — AMLODIPINE BESYLATE 5 MILLIGRAM(S): 10 TABLET ORAL at 05:53

## 2024-11-21 RX ADMIN — Medication 4: at 12:34

## 2024-11-21 NOTE — DISCHARGE NOTE PROVIDER - HOSPITAL COURSE
69y F with PMH of HTN, HLD, DM, CKD3, Cervical radiculopathy, Gall stones, Hepatic steatosis, and Osteopenia presenting with abdominal pain. Found to have distended bladder with multiple gall stones on CT with significant transaminitis and elevated lipase. Admitted to medicine for galls stone pancreatitis.    69y F with PMH of HTN, HLD, DM, CKD3, Cervical radiculopathy, Gall stones, Hepatic steatosis, and Osteopenia presenting with abdominal pain. Found to have distended bladder with multiple gall stones on CT with significant transaminitis and elevated lipase. Admitted to medicine for galls stone pancreatitis. General surgery consulted who recommeneded IV antibiotics for gallbladdder and planned robotic assisted laparoscopic cholecystectomy.   69y F with PMH of HTN, HLD, DM, CKD3, Cervical radiculopathy, Gall stones, Hepatic steatosis, and Osteopenia presenting with abdominal pain. Found to have distended bladder with multiple gall stones on CT with significant transaminitis and elevated lipase. Admitted to medicine for galls stone pancreatitis. General surgery consulted who recommended IV antibiotics for gallbladder and planned robotic assisted laparoscopic cholecystectomy. GI also consulted who agreed with surgical plan and stated no ERCP needed pre cholecystectomy since CBD appears normal. MRCP showed gallstones and sludge in the gallbladder without evidence of thickened gallbladder wall or pericholecystic fluid, no evidence of choledocholithiasis or acute pancreatitis. Lactate was trended down to normal, and LFTs were trending down during the admission.    69y F with PMH of HTN, HLD, DM, CKD3, Cervical radiculopathy, Gall stones, Hepatic steatosis, and Osteopenia presenting with abdominal pain. Found to have distended bladder with multiple gall stones on CT with significant transaminitis and elevated lipase. Admitted to medicine for galls stone pancreatitis. General surgery consulted who recommended IV antibiotics for gallbladder and planned robotic assisted laparoscopic cholecystectomy. GI also consulted who agreed with surgical plan and stated no ERCP needed pre cholecystectomy since CBD appears normal. MRCP showed gallstones and sludge in the gallbladder without evidence of thickened gallbladder wall or pericholecystic fluid, no evidence of choledocholithiasis or acute pancreatitis. Lactate was trended down to normal, and LFTs were trending down during the admission. Patients pain improved during the admission/ S/p Cholecystectomy    For Transaminitis, Trended lactate to normal and LFTs downtrending. Acute hepatitis panel ordered which was normal- BOB, AMA, LKB, SMA    For insuline dependent type 2 diabetes, high A1c, endocrine consulted who adjusted insuline. will follow up outpatient    For stage 3 chronic kidney disease   69y F with PMH of HTN, HLD, DM, CKD3, Cervical radiculopathy, Gall stones, Hepatic steatosis, and Osteopenia presenting with abdominal pain. Found to have distended bladder with multiple gall stones on CT with significant transaminitis and elevated lipase. Admitted to medicine for galls stone pancreatitis. General surgery consulted who recommended IV antibiotics for gallbladder and planned robotic assisted laparoscopic cholecystectomy. GI also consulted who agreed with surgical plan and stated no ERCP needed pre cholecystectomy since CBD appears normal. MRCP showed gallstones and sludge in the gallbladder without evidence of thickened gallbladder wall or pericholecystic fluid, no evidence of choledocholithiasis or acute pancreatitis. Lactate was trended down to normal, and LFTs were trending down during the admission. Patients pain improved during the admission/ S/p Cholecystectomy    For Transaminitis, Trended lactate to normal and LFTs downtrending. Acute hepatitis panel ordered which was normal- BOB, AMA, LKB, SMA    For insuline dependent type 2 diabetes, high A1c, endocrine consulted who adjusted insuline. will follow up outpatient    For stage 3 chronic kidney disease    For hypertension, she was continued on amlodipine and metoprolo although she takes triplixam- medication from [facility] at home    For hyperlipidemia, her home medication atorvastatin was held due to transaminitis   69y F with PMH of HTN, HLD, DM, CKD3, Cervical radiculopathy, Gall stones, Hepatic steatosis, and Osteopenia presenting with abdominal pain. Found to have distended bladder with multiple gall stones on CT with significant transaminitis and elevated lipase. Admitted to medicine for galls stone pancreatitis. General surgery consulted who recommended IV antibiotics for gallbladder and planned robotic assisted laparoscopic cholecystectomy. GI also consulted who agreed with surgical plan and stated no ERCP needed pre cholecystectomy since CBD appears normal. MRCP showed gallstones and sludge in the gallbladder without evidence of thickened gallbladder wall or pericholecystic fluid, no evidence of choledocholithiasis or acute pancreatitis. Lactate was trended down to normal, and LFTs were trending down during the admission. Patients pain improved during the admission/ S/p Cholecystectomy    For Transaminitis, Trended lactate to normal and LFTs downtrending. Acute hepatitis panel ordered which was normal- BOB, AMA, LKB, SMA    For insuline dependent type 2 diabetes, high A1c, endocrine consulted who adjusted insuline. will follow up outpatient    For hypertension, she was continued on amlodipine and metoprolol although she takes triplixam- medication from [facility] at home    For hyperlipidemia, her home medication atorvastatin was held due to transaminitis

## 2024-11-21 NOTE — PROGRESS NOTE ADULT - ASSESSMENT
69y.o. Female POD#1 s/p RA cholecystectomy     - Diet as tolerated   - IVF  - Pain control prn  - DVT ppx  - Incentive spirometry  - OOB / ambulate   - Remainder of care per primary team  - f/u with Dr. Mcgrath for post op appt in 2 weeks  - signed out to covering PA [phone]

## 2024-11-21 NOTE — PROGRESS NOTE ADULT - SUBJECTIVE AND OBJECTIVE BOX
Subjective:  Chart Notes, Work list Manager, and fingersticks reviewed. Patient is accompanied by her daughter who translates for her. Per daughter, patient has only been eating 50% of her meals in hospital.  However, daughter states that the patient eats 100% of meals at home.     Review of systems:  Constitutional: No fever, yes weight loss, no fatigue, low energy, generalized weakness, poor appetite  Cardiovascular/ Respiratory: No palpitations,, no chest pain, no shortness of breath, no exercise intolerance, no cough, no leg/ ankle swelling  Gastrointestinal: Yes abdominal pain, no bloating, no nausea, no vomiting, no constipation, no diarrhea  Neurological: No dizziness/ lightheadedness, no tremors, no numbness/ tingling in feet, no pain/ burning in feet  Endocrine: No Frequent urination, excessive urination, excessive thirst, symptoms     MEDICATIONS  (STANDING):  amLODIPine   Tablet 5 milliGRAM(s) Oral daily  influenza  Vaccine (HIGH DOSE) 0.5 milliLiter(s) IntraMuscular once  insulin glargine Injectable (LANTUS) 12 Unit(s) SubCutaneous at bedtime  insulin lispro (ADMELOG) corrective regimen sliding scale   SubCutaneous three times a day before meals  insulin lispro (ADMELOG) corrective regimen sliding scale   SubCutaneous at bedtime  metoprolol succinate ER 50 milliGRAM(s) Oral daily  metroNIDAZOLE  IVPB 500 milliGRAM(s) IV Intermittent every 8 hours  metroNIDAZOLE  IVPB      polyethylene glycol 3350 17 Gram(s) Oral daily  senna 2 Tablet(s) Oral at bedtime    MEDICATIONS  (PRN):  bisacodyl 5 milliGRAM(s) Oral daily PRN Constipation  HYDROmorphone  Injectable 0.5 milliGRAM(s) IV Push every 4 hours PRN Severe Pain (7 - 10)  ketorolac   Injectable 15 milliGRAM(s) IV Push every 6 hours PRN Moderate Pain (4 - 6)      PHYSICAL EXAM:  VITALS: T(C): 36.7 (11-21-24 @ 11:24)  T(F): 98.1 (11-21-24 @ 11:24), Max: 98.1 (11-21-24 @ 11:24)  HR: 75 (11-21-24 @ 11:24) (68 - 84)  BP: 134/68 (11-21-24 @ 11:24) (128/68 - 142/68)  RR:  (18 - 20)  SpO2:  (94% - 95%)  Wt(kg): --  GENERAL: NAD,   HEENT:  Atraumatic, Normocephalic, drymucous membranes  THYROID: Normal size, no palpable nodules  RESPIRATORY: Clear to auscultation bilaterally; No rales, rhonchi, wheezing  CARDIOVASCULAR: Regular rate and rhythm; No murmurs; no peripheral edema  GI: Soft, nontender, non distended, +ve abdominal obesity  EXTREMITIES: +ve peripheral pulses, -ve pedal edema  SKIN: Dry, intact, No rashes or lesions  PSYCH: Alert and oriented x 3    CAPILLARY BLOOD GLUCOSE      POCT Blood Glucose.: 216 mg/dL (21 Nov 2024 12:48)  POCT Blood Glucose.: 212 mg/dL (21 Nov 2024 11:34)  POCT Blood Glucose.: 253 mg/dL (21 Nov 2024 08:14)  POCT Blood Glucose.: 319 mg/dL (20 Nov 2024 20:51)  POCT Blood Glucose.: 308 mg/dL (20 Nov 2024 17:31)  A1C with Estimated Average Glucose Result: 9.8% (11.18.24 @ 05:27)    11-21    139  |  109[H]  |  16  ----------------------------<  261[H]  4.3   |  22  |  1.15    eGFR: 52[L]    Ca    9.1      11-21  Mg     1.8     11-21  Phos  3.0     11-21    TPro  6.4  /  Alb  3.1[L]  /  TBili  0.5  /  DBili  x   /  AST  58[H]  /  ALT  270[H]  /  AlkPhos  318[H]  11-21    Constitutional: No acute distress, ill- appearing  HEENT: Moist mucous membranes  Cardiovascular:  Regular heart rate, normal S1 and S2 sounds, without murmur, rub or gallop.  Gastrointestinal: Soft, non tender without hepatosplenomegaly and masses, no abdominal obesity  Extremities: Sensation intact in feet, no cyanosis, clubbing or edema, positive pedal pulses  Neurological:  Oriented to person, place and time    Labs                          10.9   8.62  )-----------( 226      ( 21 Nov 2024 05:13 )             32.7      11-21    139  |  109[H]  |  16  ----------------------------<  261[H]  4.3   |  22  |  1.15    Ca    9.1      21 Nov 2024 05:13  Phos  3.0     11-21  Mg     1.8     11-21    TPro  6.4  /  Alb  3.1[L]  /  TBili  0.5  /  DBili  x   /  AST  58[H]  /  ALT  270[H]  /  AlkPhos  318[H]  11-21     Assessment and Plan:  69y F with PMH of HTN, HLD, DM, CKD3, Cervical radiculopathy, Gall stones, Hepatic steatosis, and Osteopenia presenting with abdominal pain. Admitted for gallbladder pancreatitis.  Endocrinology is consulted for glycemic management    1) Poorly controlled Type 2 diabetes:  2) Pancreatitis    A1C above goal despite of three antidiabetes medications likely due to dietary indiscretion    Adjust the insulin as below    Inpatient Recommendations:  Basal Insulin:   Start Glargine ( Lantus)-- units tonight, from tomorrow night increase lantus to -- units at bedtime    Nutritional Insulin:  -----    Correctional Insulin:  Change to moderate Lispro ( Admelog) correctional scale with meals and bedtime    Oral Diabetes Medications:  None in the hospital    Extensive education to patient and patient's daughter about diabetes, hyperglycemia, hypoglycemia, glucose self-monitoring with glucometer, glucose target ranges, adherence to diabetes medications, diet, physical activity, importance of following up with outpatient endocrinology/ opthalmology and podiatry appointments discussed.   Discussed the six inch plate method rule  Explained the definition of HBA1C and target range.   Explained the complications of diabetes including stroke, renal failure and blindness  Reviewed hypoglycemic sign/symptoms and necessary precautions.   Discussed the goal fasting and postprandial BS at home  Patient verbalized understanding and agrees with the plan     Subjective:  Chart Notes, Work list Manager, and fingersticks reviewed. Patient is accompanied by her daughter who translates for her. Per daughter, patient has only been eating 50% of her meals in hospital.  However, daughter states that the patient eats 100% of meals at home.     Review of systems:  Constitutional: No fever, yes weight loss, no fatigue, low energy, generalized weakness, poor appetite  Cardiovascular/ Respiratory: No palpitations,, no chest pain, no shortness of breath, no exercise intolerance, no cough, no leg/ ankle swelling  Gastrointestinal: Yes abdominal pain, no bloating, no nausea, no vomiting, no constipation, no diarrhea  Neurological: No dizziness/ lightheadedness, no tremors, no numbness/ tingling in feet, no pain/ burning in feet  Endocrine: No Frequent urination, excessive urination, excessive thirst, symptoms     MEDICATIONS  (STANDING):  amLODIPine   Tablet 5 milliGRAM(s) Oral daily  influenza  Vaccine (HIGH DOSE) 0.5 milliLiter(s) IntraMuscular once  insulin glargine Injectable (LANTUS) 12 Unit(s) SubCutaneous at bedtime  insulin lispro (ADMELOG) corrective regimen sliding scale   SubCutaneous three times a day before meals  insulin lispro (ADMELOG) corrective regimen sliding scale   SubCutaneous at bedtime  metoprolol succinate ER 50 milliGRAM(s) Oral daily  metroNIDAZOLE  IVPB 500 milliGRAM(s) IV Intermittent every 8 hours  metroNIDAZOLE  IVPB      polyethylene glycol 3350 17 Gram(s) Oral daily  senna 2 Tablet(s) Oral at bedtime    MEDICATIONS  (PRN):  bisacodyl 5 milliGRAM(s) Oral daily PRN Constipation  HYDROmorphone  Injectable 0.5 milliGRAM(s) IV Push every 4 hours PRN Severe Pain (7 - 10)  ketorolac   Injectable 15 milliGRAM(s) IV Push every 6 hours PRN Moderate Pain (4 - 6)      PHYSICAL EXAM:  VITALS: T(C): 36.7 (11-21-24 @ 11:24)  T(F): 98.1 (11-21-24 @ 11:24), Max: 98.1 (11-21-24 @ 11:24)  HR: 75 (11-21-24 @ 11:24) (68 - 84)  BP: 134/68 (11-21-24 @ 11:24) (128/68 - 142/68)  RR:  (18 - 20)  SpO2:  (94% - 95%)  Wt(kg): --  GENERAL: NAD,   HEENT:  Atraumatic, Normocephalic, drymucous membranes  THYROID: Normal size, no palpable nodules  RESPIRATORY: Clear to auscultation bilaterally; No rales, rhonchi, wheezing  CARDIOVASCULAR: Regular rate and rhythm; No murmurs; no peripheral edema  GI: Soft, nontender, non distended, +ve abdominal obesity  EXTREMITIES: +ve peripheral pulses, -ve pedal edema  SKIN: Dry, intact, No rashes or lesions  PSYCH: Alert and oriented x 3    CAPILLARY BLOOD GLUCOSE      POCT Blood Glucose.: 216 mg/dL (21 Nov 2024 12:48)  POCT Blood Glucose.: 212 mg/dL (21 Nov 2024 11:34)  POCT Blood Glucose.: 253 mg/dL (21 Nov 2024 08:14)  POCT Blood Glucose.: 319 mg/dL (20 Nov 2024 20:51)  POCT Blood Glucose.: 308 mg/dL (20 Nov 2024 17:31)  A1C with Estimated Average Glucose Result: 9.8% (11.18.24 @ 05:27)    11-21    139  |  109[H]  |  16  ----------------------------<  261[H]  4.3   |  22  |  1.15    eGFR: 52[L]    Ca    9.1      11-21  Mg     1.8     11-21  Phos  3.0     11-21    TPro  6.4  /  Alb  3.1[L]  /  TBili  0.5  /  DBili  x   /  AST  58[H]  /  ALT  270[H]  /  AlkPhos  318[H]  11-21    Constitutional: No acute distress, ill- appearing  HEENT: Moist mucous membranes  Cardiovascular:  Regular heart rate, normal S1 and S2 sounds, without murmur, rub or gallop.  Gastrointestinal: Soft, non tender without hepatosplenomegaly and masses, no abdominal obesity  Extremities: Sensation intact in feet, no cyanosis, clubbing or edema, positive pedal pulses  Neurological:  Oriented to person, place and time    Labs                          10.9   8.62  )-----------( 226      ( 21 Nov 2024 05:13 )             32.7      11-21    139  |  109[H]  |  16  ----------------------------<  261[H]  4.3   |  22  |  1.15    Ca    9.1      21 Nov 2024 05:13  Phos  3.0     11-21  Mg     1.8     11-21    TPro  6.4  /  Alb  3.1[L]  /  TBili  0.5  /  DBili  x   /  AST  58[H]  /  ALT  270[H]  /  AlkPhos  318[H]  11-21     Assessment and Plan:  69y F with PMH of HTN, HLD, DM, CKD3, Cervical radiculopathy, Gall stones, Hepatic steatosis, and Osteopenia presenting with abdominal pain. Admitted for gallbladder pancreatitis.  Endocrinology is consulted for glycemic management    1) Poorly controlled Type 2 diabetes:  2) Pancreatitis    A1C above goal despite of three antidiabetes medications likely due to dietary indiscretion    Adjust the insulin as below    Basal Insulin:   Glargine ( Lantus) 18 units at bedtime  If sugars are low in the morning, give 2 units less   Metformin 1000 mg BID   Farxiga     Oral Diabetes Medications:  None in the hospital    Extensive education to patient and patient's daughter about diabetes, hyperglycemia, hypoglycemia, glucose self-monitoring with glucometer, glucose target ranges, adherence to diabetes medications, diet, physical activity, importance of following up with outpatient endocrinology/ opthalmology and podiatry appointments discussed.   Discussed the six inch plate method rule  Explained the definition of HBA1C and target range.   Explained the complications of diabetes including stroke, renal failure and blindness  Reviewed hypoglycemic sign/symptoms and necessary precautions.   Discussed the goal fasting and postprandial BS at home  Patient verbalized understanding and agrees with the plan     Subjective:  Chart Notes, Work list Manager, and fingersticks reviewed. Patient is accompanied by her daughter who translates for her. Per daughter, patient has only been eating 50% of her meals in hospital.  However, daughter states that the patient eats 100% of meals at home.     Review of systems:  Constitutional: No fever, yes weight loss, no fatigue, low energy, generalized weakness, poor appetite  Cardiovascular/ Respiratory: No palpitations,, no chest pain, no shortness of breath, no exercise intolerance, no cough, no leg/ ankle swelling  Gastrointestinal: Yes abdominal pain, no bloating, no nausea, no vomiting, no constipation, no diarrhea  Neurological: No dizziness/ lightheadedness, no tremors, no numbness/ tingling in feet, no pain/ burning in feet  Endocrine: No Frequent urination, excessive urination, excessive thirst, symptoms     Medications (Standing):  amLODIPine   Tablet 5 milliGRAM(s) Oral daily  influenza  Vaccine (HIGH DOSE) 0.5 milliLiter(s) IntraMuscular once  insulin glargine Injectable (LANTUS) 12 Unit(s) SubCutaneous at bedtime  insulin lispro (ADMELOG) corrective regimen sliding scale   SubCutaneous three times a day before meals  insulin lispro (ADMELOG) corrective regimen sliding scale   SubCutaneous at bedtime  metoprolol succinate ER 50 milliGRAM(s) Oral daily  metroNIDAZOLE  IVPB 500 milliGRAM(s) IV Intermittent every 8 hours  metroNIDAZOLE  IVPB      polyethylene glycol 3350 17 Gram(s) Oral daily  senna 2 Tablet(s) Oral at bedtime    Medications  (PRN):  bisacodyl 5 milliGRAM(s) Oral daily PRN Constipation  HYDROmorphone  Injectable 0.5 milliGRAM(s) IV Push every 4 hours PRN Severe Pain (7 - 10)  ketorolac   Injectable 15 milliGRAM(s) IV Push every 6 hours PRN Moderate Pain (4 - 6)    Physical examination:  VITALS: T(C): 36.7 (11-21-24 @ 11:24)  T(F): 98.1 (11-21-24 @ 11:24), Max: 98.1 (11-21-24 @ 11:24)  HR: 75 (11-21-24 @ 11:24) (68 - 84)  BP: 134/68 (11-21-24 @ 11:24) (128/68 - 142/68)  RR:  (18 - 20)  SpO2:  (94% - 95%)  Wt(kg): --  GENERAL: NAD,   HEENT:  Dry mucous membranes  THYROID: Normal size, no palpable nodules  GI: Soft, nontender, non distended, +ve abdominal obesity  EXTREMITIES: +ve peripheral pulses, -ve pedal edema  SKIN: Dry, intact, No rashes or lesions  PSYCH: Alert and oriented x 3    Labs:  Capillary Blood Glucose:  POCT Blood Glucose.: 216 mg/dL (21 Nov 2024 12:48)  POCT Blood Glucose.: 212 mg/dL (21 Nov 2024 11:34)  POCT Blood Glucose.: 253 mg/dL (21 Nov 2024 08:14)  POCT Blood Glucose.: 319 mg/dL (20 Nov 2024 20:51)  POCT Blood Glucose.: 308 mg/dL (20 Nov 2024 17:31)    11-21  139  |  109[H]  |  16  ----------------------------<  261[H]  4.3   |  22  |  1.15  eGFR: 52[L]  Ca    9.1      11-21  Mg     1.8     11-21  Phos  3.0     11-21  TPro  6.4  /  Alb  3.1[L]  /  TBili  0.5  /  DBili  x   /  AST  58[H]  /  ALT  270[H]  /  AlkPhos  318[H]  11-21  A1C with Estimated Average Glucose Result: 9.8 % (11.18.24 @ 05:27)     Assessment and Plan:  69y F with PMH of HTN, HLD, DM, CKD3, Cervical radiculopathy, Gall stones, Hepatic steatosis, and Osteopenia presenting with abdominal pain. Admitted for gallbladder pancreatitis.  Endocrinology is following for glycemic management    1) Poorly controlled Type 2 diabetes:  2) Pancreatitis    A1C above goal despite of three antidiabetes medications likely due to dietary indiscretion  Patient is being discharged today    Discharge Recommendation:    Lantus solostar pen 15 units at bedtime  If sugars are low in the morning, give 2 units less   Metformin 1000 mg BID   Farxiga 10 mg daily   Stop Soliqua      Please ensure patient has all insulin supplies including glucometer, test strips, lancets, alcohol pads, insulin PEN, PEN needles on discharge  Please provide endocrine office address of [address] on discharge paper. Patient needs close outpatient endocrine follow up.

## 2024-11-21 NOTE — DISCHARGE NOTE PROVIDER - NSDCFUADDAPPT_GEN_ALL_CORE_FT
APPTS ARE READY TO BE MADE: [X] YES    Best Family or Patient Contact (if needed):    Additional Information about above appointments (if needed):    1:   2:   3:     Other comments or requests:

## 2024-11-21 NOTE — DISCHARGE NOTE NURSING/CASE MANAGEMENT/SOCIAL WORK - NSDCPEFALRISK_GEN_ALL_CORE
For information on Fall & Injury Prevention, visit: [email or web address] OR  [email or web address] OR  https://www.cdc.gov/steadi/patient.html

## 2024-11-21 NOTE — PROGRESS NOTE ADULT - NUTRITIONAL ASSESSMENT
ALL OTHER SYSTEMS REVIEWED AND NEGATIVE    UNABLE TO OBTAIN      Thyroid Function Tests:        Assessment and Plan:    1) Type 2 diabetes:      Recommendations:  - Basal Insulin:   Glargine  (Lantus) units once daily    - Nutritional Insulin:  Lispro (Admelog) units with breakfast, hold if NPO or eating <50% of meals  Lispro (Admelog) units with lunch, hold if NPO or eating <50% of meals  Lispro (Admelog) units with dinner, hold if NPO or eating <50% of meals    - Correctional (Sliding) Insulin:  Low Lispro (Admelog) sliding scale with meals and bedtime    - Oral Medications:  None in the hospital

## 2024-11-21 NOTE — DISCHARGE NOTE PROVIDER - CARE PROVIDER_API CALL
Km Mcgrath [place]  Surgery  [address], [address]  [place] [address]  Phone: [phone]  Fax: [phone]  Follow Up Time: 2 weeks    JANIA SCOTT  [address] [place], [place] [id number]  Phone: [phone]  Fax: ()-  Established Patient  Follow Up Time: 1 week   Km Mcgrath [place]oha[place]  Surgery  [address], [address]  [place] [address]  Phone: [phone]  Fax: [phone]  Follow Up Time: 2 weeks    JANIA SCOTT  [address] [place] [address]  Phone: [phone]  Fax: [phone]  Established Patient  Follow Up Time: 1 week    Emelyn Durant  Endocrinology/Metab/Diabetes  [address], [address]  [place] [address]  Phone: [phone]  Fax: [phone]  Follow Up Time: 1 month    Gabriella Venegas  Gastroenterology  [address]  [place] [address]  Phone: [phone]  Fax: [phone]  Follow Up Time: 2 months

## 2024-11-21 NOTE — DISCHARGE NOTE PROVIDER - PROVIDER TOKENS
PROVIDER:[TOKEN:[[id number]],FOLLOWUP:[2 weeks]],PROVIDER:[TOKEN:[[id number]],FOLLOWUP:[1 week],ESTABLISHEDPATIENT:[T]] PROVIDER:[TOKEN:[[id number]],FOLLOWUP:[2 weeks]],PROVIDER:[TOKEN:[[id number]],FOLLOWUP:[1 week],ESTABLISHEDPATIENT:[T]],PROVIDER:[TOKEN:[[id number]],FOLLOWUP:[1 month]],PROVIDER:[TOKEN:[[id number]],FOLLOWUP:[2 months]]

## 2024-11-21 NOTE — PROGRESS NOTE ADULT - ATTENDING COMMENTS
I agree with the resident progress note/outlined plan of care. I have the edited the above note as needed. My independent findings and conclusions are documented.    The above dc recs were relayed to primary team in the morning.

## 2024-11-21 NOTE — DISCHARGE NOTE PROVIDER - NSDCCPCAREPLAN_GEN_ALL_CORE_FT
PRINCIPAL DISCHARGE DIAGNOSIS  Diagnosis: Cholelithiasis  Assessment and Plan of Treatment:       SECONDARY DISCHARGE DIAGNOSES  Diagnosis: Insulin dependent type 2 diabetes mellitus  Assessment and Plan of Treatment:     Diagnosis: Stage 3 chronic kidney disease  Assessment and Plan of Treatment:     Diagnosis: HLD (hyperlipidemia)  Assessment and Plan of Treatment:     Diagnosis: HTN (hypertension)  Assessment and Plan of Treatment:     Diagnosis: Transaminitis  Assessment and Plan of Treatment:      PRINCIPAL DISCHARGE DIAGNOSIS  Diagnosis: Gall stone pancreatitis  Assessment and Plan of Treatment:       SECONDARY DISCHARGE DIAGNOSES  Diagnosis: Cholelithiasis  Assessment and Plan of Treatment:     Diagnosis: Transaminitis  Assessment and Plan of Treatment:     Diagnosis: Insulin dependent type 2 diabetes mellitus  Assessment and Plan of Treatment:     Diagnosis: Stage 3 chronic kidney disease  Assessment and Plan of Treatment:     Diagnosis: HLD (hyperlipidemia)  Assessment and Plan of Treatment:     Diagnosis: HTN (hypertension)  Assessment and Plan of Treatment:      PRINCIPAL DISCHARGE DIAGNOSIS  Diagnosis: Gall stone pancreatitis  Assessment and Plan of Treatment: You presented to the emergency room with a complaint of abdominal pain. You have history of gallstones and were found to have distended bladder. CT scan of your abdomen was ordered which showed multiple gall stones in your gallbladder. Your lab work showed significant transaminitis and elevated lipase so you were admitted to medicine for galls stone pancreatitis. You were given pain medication for the abdominal pain. General surgery consulted who recommended IV antibiotics for gallbladder and planned robotic assisted laparoscopic cholecystectomy which was performed. Gastroenterology was consulted who agreed with surgical plan a recommended outpatient colonoscopy. MRI of abdomen showed gallstones and sludge in the gallbladder without evidence of thickened gallbladder wall or pericholecystic fluid, no evidence of choledocholithiasis or acute pancreatitis. You had elevated Lactate upon admission but it normalized within few days. Your symptoms improved after the treatment described above. Please eat low fat diet due to your gallbladder removal. Please follow up with surgeon Dr. Mcgrath in 2 weeks.  Km Mcgrath Zohaib  Surgery  [address], [address]  [place] [address]  Phone: [phone]  Fax: [phone]  Follow Up Time: 2 weeks      SECONDARY DISCHARGE DIAGNOSES  Diagnosis: Cholelithiasis  Assessment and Plan of Treatment: see above    Diagnosis: Transaminitis  Assessment and Plan of Treatment: Upon your admission, your liver function enzymes were elevated. During your admission, the liver function enzymes were trending down as your symptoms were improving. Rest see above.    Diagnosis: Insulin dependent type 2 diabetes mellitus  Assessment and Plan of Treatment: You have history of diabetes. Your HbA1c was 9.8 during this admission. We consulted endocrinologist who adjusted your insulin regimen whiel your were here. She recommended Insulin lantus 15 units at bedtime and Metformin 1000 mg twice a day at home. You need to continue monitoring your blood sugar levels closely. Eat a diet that is low in added starches and sugars. Diabetes is associated with increased risk of many health conditions, including heart attacks, stroke, infections, kidney failure, and blindness. If you are physically able to, exercise at least 3 times a week. Please follow up with your primary care doctor/Endocrinologist within a week of discharge.  Emelyn Durant  Endocrinology/Metab/Diabetes  [address], [address]  [place] [address]  Phone: [phone]  Fax: [phone]  Follow Up Time: 1 month      Diagnosis: HLD (hyperlipidemia)  Assessment and Plan of Treatment: Your cholesterol level is high called Hyperlipidemia which is a high level of lipids (fats) in your blood. These lipids include cholesterol or triglycerides. Lipids are made by your body. They also come from the foods you eat. Your body needs lipids to work properly, but high levels increase your risk for heart disease, heart attack, and stroke.   Do not smoke. Nicotine and other chemicals in cigarettes and cigars can increase your risk for a heart attack and stroke.   Eat heart-healthy foods. A dietitian or your provider can give you more information on low-sodium plans or the DASH (Dietary Approaches to Stop Hypertension) eating plan. The DASH plan is low in sodium, processed sugar, unhealthy fats, and total fat. It is high in potassium, calcium, and fiber. It is high in potassium, calcium, and fiber. These can be found in vegetables, fruit, and whole-grain foods. The following are ways to get more heart-healthy foods:  Decrease the total amount of fat you eat. Choose lean meats, fat-free or 1% fat milk, and low-fat dairy products, such as yogurt and cheese. Limit or do not eat red meat. Red meats are high in fat and cholesterol.  Replace unhealthy fats with healthy fats. Unhealthy fats include saturated fat, trans fat, and cholesterol. Choose soft margarines that are low in saturated fat and have little or no trans fat. Monounsaturated fats are healthy fats. These are found in olive oil, canola oil, avocado, and nuts. Polyunsaturated fats are also healthy. These are found in fish, flaxseed, walnuts, and soybeans.  Eat 5 or more servings of fruits and vegetables every day. They are low in calories and fat and a good source of essential vitamins. Include dark green, red, and orange vegetables. Examples include spinach, kale, broccoli, and carrots.  Eat foods high in fiber. Fiber can help lower your cholesterol levels. Choose whole grain, high-fiber foods. Good choices include whole-wheat breads or cereals, beans, peas, fruits, and vegetables.      Diagnosis: HTN (hypertension)  Assessment and Plan of Treatment: You have hypertention. We continued you on some of your home medication metoprol and amlodipine. You may continue to take your home medication Triplixam. Follow up with your primary care doctor.    Diagnosis: Cyst of left kidney  Assessment and Plan of Treatment: We perfomed CT scan of your  abdomen for your pain. It showed an incidental finding a cyst on your left kidney. CT, ultrasound and MRI readings are noted below for future follow up. A patient navigator has already contacted you regarding this to help you navigate a follow up. A nurse navigator will call you to assist with outpatient care.   CT-Trace nonspecific bilateral perinephric stranding without hydronephrosis. Indeterminate left renal hypodense lesion measuring 2.8 x 2.3 cm.  Kidney US- No hydronephrosis. 2.9 x 1.1 x 2.5 cm hypodense lesion without definite posterior acoustic enhancement.  MRI- 2.7 cm hemorrhagic or proteinaceous cyst in the left kidney. The right kidney appears unremarkable.

## 2024-11-21 NOTE — DISCHARGE NOTE PROVIDER - NSDCPNSUBOBJ_GEN_ALL_CORE
no acute events o/n. feels well. No complaints. tolerating po diet well. d/w patient and daughter Betzaida at bedside care plan and change in diabetes meds including insulin and metformin. stable to [place] home

## 2024-11-21 NOTE — DISCHARGE NOTE PROVIDER - NSDCMRMEDTOKEN_GEN_ALL_CORE_FT
ATORVASTATIN 80 MG TABLET: 0.5 tab(s) orally once a day (at bedtime) TAKE 1 TABLET BY MOUTH EVERY DAY  FARXIGA 10 MG TABLET: 1 tab(s) orally once a day TAKE 1 TABLET BY MOUTH EVERY DAY  Metformax 1000m Tab once a day at lunch time  METFORMIN 850MG TAB: 1 tab(s) orally 2 times a day  METOPROLOL SUCC ER 50 MG TAB: 1 tab(s) orally once a day TAKE 1 TABLET BY MOUTH EVERY DAY  SOLIQUA 100 UNIT-33 MCG/ML PEN: 40 unit(s) subcutaneous once a day  Triplixam (perindopril-indapamide-amlodipine) 10mg-2.5mg-10mg: One tab once a day  Vitamin D3 25 mcg (1000 intl units) oral tablet: 1 tab(s) orally once a day   ATORVASTATIN 80 MG TABLET: 0.5 tab(s) orally once a day (at bedtime) TAKE 1 TABLET BY MOUTH EVERY DAY  glucometer (per patient&#x27;s insurance): Test blood sugars four times a day. Dispense #1 glucometer.  lancets: 1 application subcutaneously 4 times a day  Lantus 100 units/mL subcutaneous solution: 15 unit(s) subcutaneous once a day (at bedtime)  Metformax 1000m Tab once a day at lunch time  METOPROLOL SUCC ER 50 MG TAB: 1 tab(s) orally once a day TAKE 1 TABLET BY MOUTH EVERY DAY  SOLIQUA 100 UNIT-33 MCG/ML PEN: 40 unit(s) subcutaneous once a day  test strips (per patient&#x27;s insurance): 1 application subcutaneously 4 times a day. ** Compatible with patient&#x27;s glucometer **  Triplixam (perindopril-indapamide-amlodipine) 10mg-2.5mg-10mg: One tab once a day  Vitamin D3 25 mcg (1000 intl units) oral tablet: 1 tab(s) orally once a day   ATORVASTATIN 80 MG TABLET: 0.5 tab(s) orally once a day (at bedtime) TAKE 1 TABLET BY MOUTH EVERY DAY  glucometer (per patient&#x27;s insurance): Test blood sugars four times a day. Dispense #1 glucometer.  lancets: 1 application subcutaneously 4 times a day  Lantus 100 units/mL subcutaneous solution: 15 unit(s) subcutaneous once a day (at bedtime)  Metformax 1000m Tab once a day at lunch time  metFORMIN 1000 mg oral tablet: 1 tab(s) orally 2 times a day  METOPROLOL SUCC ER 50 MG TAB: 1 tab(s) orally once a day TAKE 1 TABLET BY MOUTH EVERY DAY  SOLIQUA 100 UNIT-33 MCG/ML PEN: 40 unit(s) subcutaneous once a day  test strips (per patient&#x27;s insurance): 1 application subcutaneously 4 times a day. ** Compatible with patient&#x27;s glucometer **  Triplixam (perindopril-indapamide-amlodipine) 10mg-2.5mg-10mg: One tab once a day  Vitamin D3 25 mcg (1000 intl units) oral tablet: 1 tab(s) orally once a day

## 2024-11-21 NOTE — PROGRESS NOTE ADULT - SUBJECTIVE AND OBJECTIVE BOX
INTERVAL HPI/OVERNIGHT EVENTS:    Pt seen and examined at bedside. No overnight events.  Tolerating regular diet without issue. Denies nausea, vomiting, fever, chills.  Pt has been voiding and ambulating without issue as well.     Vital Signs Last 24 Hrs  T(C): 36.4 (21 Nov 2024 05:19), Max: 36.9 (20 Nov 2024 11:46)  T(F): 97.5 (21 Nov 2024 05:19), Max: 98.4 (20 Nov 2024 11:46)  HR: 74 (21 Nov 2024 05:19) (62 - 90)  BP: 128/68 (21 Nov 2024 05:19) (111/66 - 146/66)  BP(mean): 76 (20 Nov 2024 12:46) (68 - 86)  RR: 18 (21 Nov 2024 05:19) (15 - 21)  SpO2: 95% (21 Nov 2024 05:19) (94% - 99%)    Parameters below as of 21 Nov 2024 05:19  Patient On (Oxygen Delivery Method): room air      I&O's Detail    20 Nov 2024 07:01  -  21 Nov 2024 07:00  --------------------------------------------------------  IN:    Lactated Ringers Bolus: 750 mL  Total IN: 750 mL    OUT:  Total OUT: 0 mL    Total NET: 750 mL        bisacodyl 5 milliGRAM(s) Oral daily PRN  metroNIDAZOLE  IVPB 500 milliGRAM(s) IV Intermittent every 8 hours  metroNIDAZOLE  IVPB      polyethylene glycol 3350 17 Gram(s) Oral daily  senna 2 Tablet(s) Oral at bedtime      Physical Exam  General: No acute distress  Skin: No jaundice, no icterus  Abdomen: soft,  nondistended, appropriate incisional tenderness, no rebound tenderness, no guarding, no palpable masses  incision sites C/D/I  Extremities: non edematous, no calf pain bilaterally    Drains/Tubes:     Labs:                        10.9   8.62  )-----------( 226      ( 21 Nov 2024 05:13 )             32.7     11-21    139  |  109[H]  |  16  ----------------------------<  261[H]  4.3   |  22  |  1.15    Ca    9.1      21 Nov 2024 05:13  Phos  3.0     11-21  Mg     1.8     11-21    TPro  6.4  /  Alb  3.1[L]  /  TBili  0.5  /  DBili  x   /  AST  58[H]  /  ALT  270[H]  /  AlkPhos  318[H]  11-21    PT/INR - ( 20 Nov 2024 05:00 )   PT: 11.0 sec;   INR: 0.95 ratio             RADIOLOGY & ADDITIONAL STUDIES:    69yFemale

## 2024-11-21 NOTE — DISCHARGE NOTE NURSING/CASE MANAGEMENT/SOCIAL WORK - PATIENT PORTAL LINK FT
You can access the FollowMyHealth Patient Portal offered by [facility] by registering at the following website: [email or web address]. By joining "[employer]’s FollowMyHealth portal, you will also be able to view your health information using other applications (apps) compatible with our system.

## 2024-11-21 NOTE — DISCHARGE NOTE PROVIDER - NSDCCAREPROVSEEN_GEN_ALL_CORE_FT
Francisco Javier, Freddy Morelos, Priyank Evans, Ny Mcgrath, Km Fitch, Anna Durant, [facility], [place]

## 2024-11-21 NOTE — DISCHARGE NOTE NURSING/CASE MANAGEMENT/SOCIAL WORK - FINANCIAL ASSISTANCE
[facility] provides services at a reduced cost to those who are determined to be eligible through [facility]’s financial assistance program. Information regarding [facility]’s financial assistance program can be found by going to [email or web address] or by calling [phone].

## 2024-11-22 ENCOUNTER — NON-APPOINTMENT (OUTPATIENT)
Age: 69
End: 2024-11-22

## 2024-11-22 PROBLEM — Z00.00 ENCOUNTER FOR PREVENTIVE HEALTH EXAMINATION: Status: ACTIVE | Noted: 2024-11-22

## 2024-11-22 LAB
CULTURE RESULTS: SIGNIFICANT CHANGE UP
CULTURE RESULTS: SIGNIFICANT CHANGE UP
SPECIMEN SOURCE: SIGNIFICANT CHANGE UP
SPECIMEN SOURCE: SIGNIFICANT CHANGE UP

## 2024-11-25 ENCOUNTER — NON-APPOINTMENT (OUTPATIENT)
Age: 69
End: 2024-11-25

## 2024-11-25 PROBLEM — N18.30 CHRONIC KIDNEY DISEASE, STAGE 3 UNSPECIFIED: Chronic | Status: ACTIVE | Noted: 2024-11-17

## 2024-11-25 PROBLEM — M54.12 RADICULOPATHY, CERVICAL REGION: Chronic | Status: ACTIVE | Noted: 2024-11-17

## 2024-11-25 PROBLEM — M85.80 OTHER SPECIFIED DISORDERS OF BONE DENSITY AND STRUCTURE, UNSPECIFIED SITE: Chronic | Status: ACTIVE | Noted: 2024-11-17

## 2024-11-25 PROBLEM — K80.20 CALCULUS OF GALLBLADDER WITHOUT CHOLECYSTITIS WITHOUT OBSTRUCTION: Chronic | Status: ACTIVE | Noted: 2024-11-17

## 2024-11-25 PROBLEM — I10 ESSENTIAL (PRIMARY) HYPERTENSION: Chronic | Status: ACTIVE | Noted: 2024-11-17

## 2024-11-25 PROBLEM — U07.1 COVID-19: Chronic | Status: ACTIVE | Noted: 2024-11-17

## 2024-11-25 PROBLEM — K76.0 FATTY (CHANGE OF) LIVER, NOT ELSEWHERE CLASSIFIED: Chronic | Status: ACTIVE | Noted: 2024-11-17

## 2024-12-02 ENCOUNTER — APPOINTMENT (OUTPATIENT)
Dept: SURGERY | Facility: CLINIC | Age: 69
End: 2024-12-02
Payer: MEDICARE

## 2024-12-02 VITALS
HEIGHT: 61.02 IN | SYSTOLIC BLOOD PRESSURE: 148 MMHG | BODY MASS INDEX: 28.51 KG/M2 | TEMPERATURE: 99 F | OXYGEN SATURATION: 96 % | HEART RATE: 104 BPM | DIASTOLIC BLOOD PRESSURE: 84 MMHG | WEIGHT: 151 LBS

## 2024-12-02 DIAGNOSIS — E78.5 HYPERLIPIDEMIA, UNSPECIFIED: ICD-10-CM

## 2024-12-02 DIAGNOSIS — Z83.438 FAMILY HISTORY OF OTHER DISORDER OF LIPOPROTEIN METABOLISM AND OTHER LIPIDEMIA: ICD-10-CM

## 2024-12-02 DIAGNOSIS — I10 ESSENTIAL (PRIMARY) HYPERTENSION: ICD-10-CM

## 2024-12-02 DIAGNOSIS — K85.10 BILIARY ACUTE PANCREATITIS WITHOUT NECROSIS OR INFECTION: ICD-10-CM

## 2024-12-02 DIAGNOSIS — Z82.49 FAMILY HISTORY OF ISCHEMIC HEART DISEASE AND OTHER DISEASES OF THE CIRCULATORY SYSTEM: ICD-10-CM

## 2024-12-02 PROCEDURE — 99024 POSTOP FOLLOW-UP VISIT: CPT

## 2024-12-02 RX ORDER — METFORMIN HYDROCHLORIDE 625 MG/1
TABLET ORAL
Refills: 0 | Status: ACTIVE | COMMUNITY

## 2024-12-02 RX ORDER — INSULIN GLARGINE 100 [IU]/ML
100 INJECTION, SOLUTION SUBCUTANEOUS
Refills: 0 | Status: ACTIVE | COMMUNITY

## 2024-12-02 RX ORDER — ATORVASTATIN CALCIUM 80 MG/1
80 TABLET, FILM COATED ORAL
Refills: 0 | Status: ACTIVE | COMMUNITY

## 2024-12-09 ENCOUNTER — APPOINTMENT (OUTPATIENT)
Dept: UROLOGY | Facility: CLINIC | Age: 69
End: 2024-12-09
Payer: MEDICARE

## 2024-12-09 ENCOUNTER — NON-APPOINTMENT (OUTPATIENT)
Age: 69
End: 2024-12-09

## 2024-12-09 VITALS
SYSTOLIC BLOOD PRESSURE: 130 MMHG | HEART RATE: 73 BPM | DIASTOLIC BLOOD PRESSURE: 73 MMHG | RESPIRATION RATE: 17 BRPM | OXYGEN SATURATION: 96 %

## 2024-12-09 DIAGNOSIS — N28.1 CYST OF KIDNEY, ACQUIRED: ICD-10-CM

## 2024-12-09 PROCEDURE — 99213 OFFICE O/P EST LOW 20 MIN: CPT

## 2024-12-12 LAB — SURGICAL PATHOLOGY STUDY: SIGNIFICANT CHANGE UP

## 2024-12-30 NOTE — PRE-OP CHECKLIST - SURGICAL CONSENT
What Type Of Note Output Would You Prefer (Optional)?: Bullet Format
Hpi Title: Evaluation of Skin Lesions
not in the chart
not in the chart

## 2025-01-06 ENCOUNTER — NON-APPOINTMENT (OUTPATIENT)
Age: 70
End: 2025-01-06

## 2025-01-06 ENCOUNTER — APPOINTMENT (OUTPATIENT)
Dept: SURGERY | Facility: CLINIC | Age: 70
End: 2025-01-06

## 2025-01-06 VITALS
BODY MASS INDEX: 28.51 KG/M2 | SYSTOLIC BLOOD PRESSURE: 111 MMHG | HEART RATE: 67 BPM | HEIGHT: 61.02 IN | OXYGEN SATURATION: 96 % | DIASTOLIC BLOOD PRESSURE: 70 MMHG | WEIGHT: 151 LBS

## 2025-01-06 DIAGNOSIS — N28.1 CYST OF KIDNEY, ACQUIRED: ICD-10-CM

## 2025-01-06 DIAGNOSIS — K85.10 BILIARY ACUTE PANCREATITIS WITHOUT NECROSIS OR INFECTION: ICD-10-CM

## 2025-01-06 DIAGNOSIS — Z83.438 FAMILY HISTORY OF OTHER DISORDER OF LIPOPROTEIN METABOLISM AND OTHER LIPIDEMIA: ICD-10-CM

## 2025-01-06 DIAGNOSIS — E78.5 HYPERLIPIDEMIA, UNSPECIFIED: ICD-10-CM

## 2025-01-06 DIAGNOSIS — Z82.49 FAMILY HISTORY OF ISCHEMIC HEART DISEASE AND OTHER DISEASES OF THE CIRCULATORY SYSTEM: ICD-10-CM

## 2025-01-06 DIAGNOSIS — I10 ESSENTIAL (PRIMARY) HYPERTENSION: ICD-10-CM

## (undated) DEVICE — DRAPE TOWEL BLUE STICKY

## (undated) DEVICE — XI OBTURATOR OPTICAL BLADELESS 8MM

## (undated) DEVICE — XI ENDOWRIST SUCTION IRRIGATOR 8MM

## (undated) DEVICE — SUT POLYSORB 0 30" GU-46

## (undated) DEVICE — TROCAR COVIDIEN VERSAONE BLADED SMOOTH 5MM STANDARD

## (undated) DEVICE — XI ARM FORCEP CADIERE 8MM

## (undated) DEVICE — ELCTR BOVIE BLADE 3/4" EXTENDED LENGTH 6"

## (undated) DEVICE — ELCTR CORD FOOTSWITCH 1PLR LAPSCP 10FT

## (undated) DEVICE — SUT VICRYL 4-0 18" PS-2 UNDYED

## (undated) DEVICE — D HELP - CLEARVIEW CLEARIFY SYSTEM

## (undated) DEVICE — TUBING STRYKEFLOW II SUCTION / IRRIGATOR

## (undated) DEVICE — DRAPE XL SHEET 77X98"

## (undated) DEVICE — XI SEAL UNIVERSIAL 5-12MM

## (undated) DEVICE — ENDOCATCH 10MM SPECIMEN POUCH

## (undated) DEVICE — PACK ROBOTIC

## (undated) DEVICE — SUT VICRYL 0 27" UR-6

## (undated) DEVICE — XI DRAPE ARM

## (undated) DEVICE — XI ARM PERMANENT CAUTERY HOOK

## (undated) DEVICE — XI DRAPE COLUMN

## (undated) DEVICE — XI ARM SCISSOR ROUND TIP 8MM

## (undated) DEVICE — TUBING CONNECTING 6MM 20FT